# Patient Record
Sex: FEMALE | Race: WHITE | NOT HISPANIC OR LATINO | Employment: FULL TIME | URBAN - NONMETROPOLITAN AREA
[De-identification: names, ages, dates, MRNs, and addresses within clinical notes are randomized per-mention and may not be internally consistent; named-entity substitution may affect disease eponyms.]

---

## 2017-07-15 ENCOUNTER — HOSPITAL ENCOUNTER (EMERGENCY)
Facility: HOSPITAL | Age: 27
Discharge: HOME | End: 2017-07-15
Admitting: EMERGENCY MEDICINE

## 2017-07-15 VITALS
DIASTOLIC BLOOD PRESSURE: 76 MMHG | RESPIRATION RATE: 18 BRPM | TEMPERATURE: 98.5 F | BODY MASS INDEX: 27.82 KG/M2 | HEIGHT: 63 IN | WEIGHT: 157 LBS | SYSTOLIC BLOOD PRESSURE: 120 MMHG | HEART RATE: 73 BPM | OXYGEN SATURATION: 100 %

## 2017-07-15 DIAGNOSIS — G43.009 MIGRAINE WITHOUT AURA AND WITHOUT STATUS MIGRAINOSUS, NOT INTRACTABLE: Primary | ICD-10-CM

## 2017-07-15 LAB
B-HCG UR QL: NEGATIVE
INTERNAL NEGATIVE CONTROL: NEGATIVE
INTERNAL POSITIVE CONTROL: POSITIVE
Lab: NORMAL

## 2017-07-15 PROCEDURE — 25010000002 KETOROLAC TROMETHAMINE PER 15 MG: Performed by: PHYSICIAN ASSISTANT

## 2017-07-15 PROCEDURE — 25010000002 DIPHENHYDRAMINE PER 50 MG: Performed by: PHYSICIAN ASSISTANT

## 2017-07-15 PROCEDURE — 25010000002 PROCHLORPERAZINE EDISYLATE PER 10 MG: Performed by: PHYSICIAN ASSISTANT

## 2017-07-15 PROCEDURE — 96372 THER/PROPH/DIAG INJ SC/IM: CPT

## 2017-07-15 PROCEDURE — 99283 EMERGENCY DEPT VISIT LOW MDM: CPT

## 2017-07-15 RX ORDER — DIPHENHYDRAMINE HYDROCHLORIDE 50 MG/ML
25 INJECTION INTRAMUSCULAR; INTRAVENOUS ONCE
Status: COMPLETED | OUTPATIENT
Start: 2017-07-15 | End: 2017-07-15

## 2017-07-15 RX ORDER — KETOROLAC TROMETHAMINE 30 MG/ML
30 INJECTION, SOLUTION INTRAMUSCULAR; INTRAVENOUS ONCE
Status: COMPLETED | OUTPATIENT
Start: 2017-07-15 | End: 2017-07-15

## 2017-07-15 RX ADMIN — DIPHENHYDRAMINE HYDROCHLORIDE 25 MG: 50 INJECTION, SOLUTION INTRAMUSCULAR; INTRAVENOUS at 19:48

## 2017-07-15 RX ADMIN — KETOROLAC TROMETHAMINE 30 MG: 30 INJECTION, SOLUTION INTRAMUSCULAR at 19:47

## 2017-07-15 RX ADMIN — PROCHLORPERAZINE EDISYLATE 10 MG: 5 INJECTION INTRAMUSCULAR; INTRAVENOUS at 19:50

## 2017-07-16 NOTE — ED PROVIDER NOTES
Subjective   Patient is a 27 y.o. female presenting with migraines.   History provided by:  Patient   used: No    Migraine   Pain location:  L temporal  Quality:  Dull  Radiates to:  Does not radiate  Severity currently:  8/10  Severity at highest:  8/10  Onset quality:  Gradual  Duration:  4 days  Timing:  Constant  Progression:  Unchanged  Chronicity:  Recurrent  Similar to prior headaches: yes    Context: bright light    Context: not activity, not coughing, not defecating, not eating, not stress, not intercourse and not loud noise    Relieved by:  Nothing  Worsened by:  Nothing  Ineffective treatments:  Acetaminophen  Associated symptoms: no abdominal pain, no back pain, no blurred vision, no congestion, no cough, no diarrhea, no dizziness, no eye pain, no fever and no focal weakness    Associated symptoms comment:  Noticed slight amount of blood in left ear after cleaning it today      Review of Systems   Constitutional: Negative for fever.   HENT: Negative for congestion.    Eyes: Negative for blurred vision and pain.   Respiratory: Negative for cough.    Gastrointestinal: Negative for abdominal pain and diarrhea.   Endocrine: Negative.    Musculoskeletal: Negative for back pain.   Neurological: Positive for headaches. Negative for dizziness and focal weakness.       Past Medical History:   Diagnosis Date   • Kidney stone    • Migraine        No Known Allergies    Past Surgical History:   Procedure Laterality Date   • APPENDECTOMY     • CHOLECYSTECTOMY     • DILATATION AND CURETTAGE     • KNEE ACL RECONSTRUCTION         History reviewed. No pertinent family history.    Social History     Social History   • Marital status: Single     Spouse name: N/A   • Number of children: N/A   • Years of education: N/A     Social History Main Topics   • Smoking status: Never Smoker   • Smokeless tobacco: None   • Alcohol use No   • Drug use: No   • Sexual activity: Not Asked     Other Topics Concern   •  None     Social History Narrative   • None           Objective   Physical Exam   Constitutional: She is oriented to person, place, and time. She appears well-developed and well-nourished. No distress.   HENT:   Head: Normocephalic and atraumatic.   Right Ear: Hearing and tympanic membrane normal. No lacerations. No drainage, swelling or tenderness. No foreign bodies. No mastoid tenderness. Tympanic membrane is not injected, not scarred, not perforated, not erythematous, not retracted and not bulging. No middle ear effusion. No decreased hearing is noted.   Left Ear: Hearing normal. No lacerations. No drainage, swelling or tenderness. No foreign bodies. No mastoid tenderness. Tympanic membrane is retracted. Tympanic membrane is not injected, not scarred, not perforated, not erythematous and not bulging.  No middle ear effusion. No decreased hearing is noted.   Eyes: EOM are normal. Pupils are equal, round, and reactive to light.   Cardiovascular: Normal rate, regular rhythm and normal heart sounds.  Exam reveals no friction rub.    No murmur heard.  Pulmonary/Chest: Effort normal and breath sounds normal. No respiratory distress. She has no wheezes. She has no rales.   Neurological: She is alert and oriented to person, place, and time. She has normal reflexes. No cranial nerve deficit.   Skin: Skin is warm and dry. No rash noted. She is not diaphoretic. No erythema. No pallor.   Psychiatric: She has a normal mood and affect. Her behavior is normal.   Nursing note and vitals reviewed.      Procedures         ED Course  ED Course                  MDM    Final diagnoses:   Migraine without aura and without status migrainosus, not intractable            Humberto Zambrano PA-C  07/15/17 1932

## 2017-07-16 NOTE — ED NOTES
C/O HAVING MIGRAINE FOR THE PAST 4 DAYS AND HAD LITTLE BLOOD FROM LEFT EAR.     Sandhya Son RN  07/15/17 1936

## 2017-07-16 NOTE — ED NOTES
Patient discharged home  with family at side ambulatory to personal car. No distress noted. Personal belongings with patient. Patient voiced understanding to instructions given. Pt GOEL feeling better no distress noted.No s/s adverse rx to meds given.  driving pt home.       Sandhya Son RN  07/15/17 2026

## 2017-08-22 LAB
EXTERNAL ABO GROUPING: NORMAL
EXTERNAL ANTIBODY SCREEN: NEGATIVE
EXTERNAL HEPATITIS B SURFACE ANTIGEN: NEGATIVE
EXTERNAL RH FACTOR: POSITIVE
EXTERNAL RUBELLA QUALITATIVE: NORMAL
EXTERNAL SYPHILIS RPR SCREEN: NORMAL
HIV1 P24 AG SERPL QL IA: NORMAL

## 2018-02-10 ENCOUNTER — HOSPITAL ENCOUNTER (OUTPATIENT)
Facility: HOSPITAL | Age: 28
Setting detail: OBSERVATION
Discharge: HOME | End: 2018-02-10
Attending: OBSTETRICS & GYNECOLOGY | Admitting: OBSTETRICS & GYNECOLOGY

## 2018-02-10 VITALS
SYSTOLIC BLOOD PRESSURE: 116 MMHG | BODY MASS INDEX: 29.81 KG/M2 | DIASTOLIC BLOOD PRESSURE: 79 MMHG | RESPIRATION RATE: 18 BRPM | HEIGHT: 63 IN | TEMPERATURE: 98.6 F | HEART RATE: 103 BPM | WEIGHT: 168.25 LBS

## 2018-02-10 LAB
BACTERIA UR QL AUTO: ABNORMAL /HPF
BILIRUB UR QL STRIP: NEGATIVE
CLARITY UR: CLEAR
COLOR UR: YELLOW
GLUCOSE UR STRIP-MCNC: NEGATIVE MG/DL
HGB UR QL STRIP.AUTO: ABNORMAL
HYALINE CASTS UR QL AUTO: ABNORMAL /LPF
KETONES UR QL STRIP: NEGATIVE
LEUKOCYTE ESTERASE UR QL STRIP.AUTO: NEGATIVE
NITRITE UR QL STRIP: NEGATIVE
PH UR STRIP.AUTO: 7 [PH] (ref 5–8)
PROT UR QL STRIP: NEGATIVE
RBC # UR: ABNORMAL /HPF
REF LAB TEST METHOD: ABNORMAL
SP GR UR STRIP: 1.02 (ref 1–1.03)
SQUAMOUS #/AREA URNS HPF: ABNORMAL /HPF
UROBILINOGEN UR QL STRIP: ABNORMAL
WBC UR QL AUTO: ABNORMAL /HPF

## 2018-02-10 PROCEDURE — G0463 HOSPITAL OUTPT CLINIC VISIT: HCPCS

## 2018-02-10 PROCEDURE — 99213 OFFICE O/P EST LOW 20 MIN: CPT | Performed by: OBSTETRICS & GYNECOLOGY

## 2018-02-10 PROCEDURE — 81001 URINALYSIS AUTO W/SCOPE: CPT | Performed by: OBSTETRICS & GYNECOLOGY

## 2018-02-10 PROCEDURE — G0378 HOSPITAL OBSERVATION PER HR: HCPCS

## 2018-02-11 PROBLEM — Z34.90 PREGNANCY: Status: ACTIVE | Noted: 2018-02-11

## 2018-02-11 NOTE — PROGRESS NOTES
"S: Patient here at 32 weeks c/o pelvic pressure, no contractions or vaginal bleeding. Having pain with fetal movement.  O:  /79 (BP Location: Right arm, Patient Position: Lying)  Pulse 103  Temp 98.6 °F (37 °C) (Temporal Artery )   Resp 18  Ht 160 cm (63\")  Wt 76.3 kg (168 lb 4 oz)  LMP 2017  BMI 29.8 kg/m2     Alert, NAD  Breathing unlabored  Abdomen nontender  Cervix long and closed    FHTs reactive  toco shows occasional irritability    UA shows SG 1.019, no WBC and no Nitrites    Assessment: musculoskeletal pain, 32 weeks pregnant    No evidence of  labor or UTI. Discharge home, follow up in 3 days in office.  "

## 2018-02-11 NOTE — NURSING NOTE
Arrived with c/o of lower abdominal pain since Tuesday.  Urinating more frequently.  Increased discharge, clear watery.  Pain noted with fetal movement.  No bleeeding.

## 2018-02-27 LAB — EXTERNAL GROUP B STREP ANTIGEN: NEGATIVE

## 2018-03-14 ENCOUNTER — HOSPITAL ENCOUNTER (INPATIENT)
Facility: HOSPITAL | Age: 28
LOS: 3 days | Discharge: HOME | End: 2018-03-17
Attending: OBSTETRICS & GYNECOLOGY | Admitting: OBSTETRICS & GYNECOLOGY

## 2018-03-14 PROBLEM — O42.90 PROM (PREMATURE RUPTURE OF MEMBRANES): Status: ACTIVE | Noted: 2018-03-14

## 2018-03-14 PROBLEM — O42.90 DELAYED DELIVERY AFTER SROM (SPONTANEOUS RUPTURE OF MEMBRANES): Status: ACTIVE | Noted: 2018-03-14

## 2018-03-14 LAB
A1 MICROGLOB PLACENTAL VAG QL: NEGATIVE
ABO GROUP BLD: NORMAL
BLD GP AB SCN SERPL QL: NEGATIVE
DEPRECATED RDW RBC AUTO: 38.5 FL (ref 40–54)
ERYTHROCYTE [DISTWIDTH] IN BLOOD BY AUTOMATED COUNT: 13.4 % (ref 12–15)
HCT VFR BLD AUTO: 33.1 % (ref 37–47)
HGB BLD-MCNC: 11.5 G/DL (ref 12–16)
MCH RBC QN AUTO: 27.7 PG (ref 28–32)
MCHC RBC AUTO-ENTMCNC: 34.7 G/DL (ref 33–36)
MCV RBC AUTO: 79.8 FL (ref 82–98)
PLATELET # BLD AUTO: 297 10*3/MM3 (ref 130–400)
PMV BLD AUTO: 11.9 FL (ref 6–12)
RBC # BLD AUTO: 4.15 10*6/MM3 (ref 4.2–5.4)
RH BLD: POSITIVE
WBC NRBC COR # BLD: 14.5 10*3/MM3 (ref 4.8–10.8)

## 2018-03-14 PROCEDURE — 84112 EVAL AMNIOTIC FLUID PROTEIN: CPT | Performed by: OBSTETRICS & GYNECOLOGY

## 2018-03-14 PROCEDURE — 85027 COMPLETE CBC AUTOMATED: CPT | Performed by: OBSTETRICS & GYNECOLOGY

## 2018-03-14 PROCEDURE — 51703 INSERT BLADDER CATH COMPLEX: CPT

## 2018-03-14 PROCEDURE — 25010000002 BUTORPHANOL PER 1 MG: Performed by: OBSTETRICS & GYNECOLOGY

## 2018-03-14 PROCEDURE — 86901 BLOOD TYPING SEROLOGIC RH(D): CPT | Performed by: OBSTETRICS & GYNECOLOGY

## 2018-03-14 PROCEDURE — 86850 RBC ANTIBODY SCREEN: CPT | Performed by: OBSTETRICS & GYNECOLOGY

## 2018-03-14 PROCEDURE — 25010000002 PENICILLIN G POTASSIUM PER 600000 UNITS: Performed by: OBSTETRICS & GYNECOLOGY

## 2018-03-14 PROCEDURE — 86900 BLOOD TYPING SEROLOGIC ABO: CPT | Performed by: OBSTETRICS & GYNECOLOGY

## 2018-03-14 RX ORDER — PRENATAL VIT NO.126/IRON/FOLIC 28MG-0.8MG
TABLET ORAL DAILY
COMMUNITY
End: 2020-07-02

## 2018-03-14 RX ORDER — PENICILLIN G POTASSIUM 5000000 [IU]/1
2.5 INJECTION, POWDER, FOR SOLUTION INTRAMUSCULAR; INTRAVENOUS EVERY 4 HOURS
Status: DISCONTINUED | OUTPATIENT
Start: 2018-03-14 | End: 2018-03-14

## 2018-03-14 RX ORDER — ZOLPIDEM TARTRATE 5 MG/1
5 TABLET ORAL NIGHTLY PRN
Status: DISCONTINUED | OUTPATIENT
Start: 2018-03-14 | End: 2018-03-15 | Stop reason: HOSPADM

## 2018-03-14 RX ORDER — PENICILLIN G POTASSIUM 5000000 [IU]/1
5 INJECTION, POWDER, FOR SOLUTION INTRAMUSCULAR; INTRAVENOUS ONCE
Status: DISCONTINUED | OUTPATIENT
Start: 2018-03-14 | End: 2018-03-14 | Stop reason: SDUPTHER

## 2018-03-14 RX ORDER — PENICILLIN G 3000000 [IU]/50ML
3 INJECTION, SOLUTION INTRAVENOUS EVERY 4 HOURS
Status: DISCONTINUED | OUTPATIENT
Start: 2018-03-15 | End: 2018-03-14 | Stop reason: SDUPTHER

## 2018-03-14 RX ORDER — MORPHINE SULFATE 2 MG/ML
2 INJECTION, SOLUTION INTRAMUSCULAR; INTRAVENOUS
Status: DISCONTINUED | OUTPATIENT
Start: 2018-03-14 | End: 2018-03-15

## 2018-03-14 RX ORDER — PROMETHAZINE HYDROCHLORIDE 12.5 MG/1
12.5 SUPPOSITORY RECTAL EVERY 6 HOURS PRN
Status: DISCONTINUED | OUTPATIENT
Start: 2018-03-14 | End: 2018-03-14

## 2018-03-14 RX ORDER — ONDANSETRON 2 MG/ML
4 INJECTION INTRAMUSCULAR; INTRAVENOUS EVERY 6 HOURS PRN
Status: DISCONTINUED | OUTPATIENT
Start: 2018-03-14 | End: 2018-03-15 | Stop reason: HOSPADM

## 2018-03-14 RX ORDER — PROMETHAZINE HYDROCHLORIDE 25 MG/1
12.5 TABLET ORAL EVERY 6 HOURS PRN
Status: DISCONTINUED | OUTPATIENT
Start: 2018-03-14 | End: 2018-03-14

## 2018-03-14 RX ORDER — TERBUTALINE SULFATE 1 MG/ML
0.25 INJECTION, SOLUTION SUBCUTANEOUS AS NEEDED
Status: DISCONTINUED | OUTPATIENT
Start: 2018-03-14 | End: 2018-03-15

## 2018-03-14 RX ORDER — OXYTOCIN/0.9 % SODIUM CHLORIDE 30/500 ML
2-30 PLASTIC BAG, INJECTION (ML) INTRAVENOUS
Status: DISCONTINUED | OUTPATIENT
Start: 2018-03-14 | End: 2018-03-15

## 2018-03-14 RX ORDER — PROMETHAZINE HYDROCHLORIDE 25 MG/ML
12.5 INJECTION, SOLUTION INTRAMUSCULAR; INTRAVENOUS EVERY 6 HOURS PRN
Status: DISCONTINUED | OUTPATIENT
Start: 2018-03-14 | End: 2018-03-14

## 2018-03-14 RX ORDER — PENICILLIN G POTASSIUM 5000000 [IU]/1
5 INJECTION, POWDER, FOR SOLUTION INTRAMUSCULAR; INTRAVENOUS EVERY 4 HOURS
Status: DISCONTINUED | OUTPATIENT
Start: 2018-03-14 | End: 2018-03-14

## 2018-03-14 RX ORDER — LIDOCAINE HYDROCHLORIDE 10 MG/ML
5 INJECTION, SOLUTION EPIDURAL; INFILTRATION; INTRACAUDAL; PERINEURAL AS NEEDED
Status: DISCONTINUED | OUTPATIENT
Start: 2018-03-14 | End: 2018-03-15

## 2018-03-14 RX ORDER — OXYTOCIN/RINGER'S LACTATE 20/1000 ML
999 PLASTIC BAG, INJECTION (ML) INTRAVENOUS ONCE
Status: DISCONTINUED | OUTPATIENT
Start: 2018-03-14 | End: 2018-03-15

## 2018-03-14 RX ORDER — SODIUM CHLORIDE 0.9 % (FLUSH) 0.9 %
1-10 SYRINGE (ML) INJECTION AS NEEDED
Status: DISCONTINUED | OUTPATIENT
Start: 2018-03-14 | End: 2018-03-15

## 2018-03-14 RX ORDER — PENICILLIN G 3000000 [IU]/50ML
3 INJECTION, SOLUTION INTRAVENOUS EVERY 4 HOURS
Status: DISCONTINUED | OUTPATIENT
Start: 2018-03-15 | End: 2018-03-15

## 2018-03-14 RX ORDER — BUTORPHANOL TARTRATE 1 MG/ML
1 INJECTION, SOLUTION INTRAMUSCULAR; INTRAVENOUS
Status: DISCONTINUED | OUTPATIENT
Start: 2018-03-14 | End: 2018-03-15

## 2018-03-14 RX ORDER — ONDANSETRON 4 MG/1
4 TABLET, ORALLY DISINTEGRATING ORAL EVERY 6 HOURS PRN
Status: DISCONTINUED | OUTPATIENT
Start: 2018-03-14 | End: 2018-03-15 | Stop reason: HOSPADM

## 2018-03-14 RX ORDER — ONDANSETRON 4 MG/1
4 TABLET, FILM COATED ORAL EVERY 6 HOURS PRN
Status: DISCONTINUED | OUTPATIENT
Start: 2018-03-14 | End: 2018-03-15 | Stop reason: HOSPADM

## 2018-03-14 RX ORDER — SODIUM CHLORIDE, SODIUM LACTATE, POTASSIUM CHLORIDE, CALCIUM CHLORIDE 600; 310; 30; 20 MG/100ML; MG/100ML; MG/100ML; MG/100ML
125 INJECTION, SOLUTION INTRAVENOUS CONTINUOUS
Status: DISCONTINUED | OUTPATIENT
Start: 2018-03-14 | End: 2018-03-15

## 2018-03-14 RX ORDER — LIDOCAINE HYDROCHLORIDE 10 MG/ML
5 INJECTION, SOLUTION EPIDURAL; INFILTRATION; INTRACAUDAL; PERINEURAL AS NEEDED
Status: DISCONTINUED | OUTPATIENT
Start: 2018-03-14 | End: 2018-03-14 | Stop reason: SDUPTHER

## 2018-03-14 RX ORDER — MORPHINE SULFATE 2 MG/ML
2 INJECTION, SOLUTION INTRAMUSCULAR; INTRAVENOUS
Status: DISCONTINUED | OUTPATIENT
Start: 2018-03-14 | End: 2018-03-14 | Stop reason: SDUPTHER

## 2018-03-14 RX ORDER — ACETAMINOPHEN 325 MG/1
650 TABLET ORAL EVERY 4 HOURS PRN
Status: DISCONTINUED | OUTPATIENT
Start: 2018-03-14 | End: 2018-03-15

## 2018-03-14 RX ORDER — OXYTOCIN/RINGER'S LACTATE 20/1000 ML
125 PLASTIC BAG, INJECTION (ML) INTRAVENOUS AS NEEDED
Status: DISCONTINUED | OUTPATIENT
Start: 2018-03-14 | End: 2018-03-15

## 2018-03-14 RX ADMIN — BUTORPHANOL TARTRATE 1 MG: 1 INJECTION, SOLUTION INTRAMUSCULAR; INTRAVENOUS at 21:57

## 2018-03-14 RX ADMIN — SODIUM CHLORIDE, POTASSIUM CHLORIDE, SODIUM LACTATE AND CALCIUM CHLORIDE 125 ML/HR: 600; 310; 30; 20 INJECTION, SOLUTION INTRAVENOUS at 18:44

## 2018-03-14 RX ADMIN — SODIUM CHLORIDE, POTASSIUM CHLORIDE, SODIUM LACTATE AND CALCIUM CHLORIDE 1000 ML: 600; 310; 30; 20 INJECTION, SOLUTION INTRAVENOUS at 17:56

## 2018-03-14 RX ADMIN — SODIUM CHLORIDE 5 MILLION UNITS: 900 INJECTION INTRAVENOUS at 23:23

## 2018-03-14 NOTE — NURSING NOTE
Dr Cheng called. Amnisure in progress. Jeanne positive. Labor orders given. Confirm Amnisure result.

## 2018-03-14 NOTE — NURSING NOTE
Call placed to Dr Cheng. Jeanne present on slide but Amnisure result negative. Dr Cheng states that she will come to R to evaluate with Speculum exam around 1830. Hold Pitocin for now.--CLH

## 2018-03-15 ENCOUNTER — ANESTHESIA (OUTPATIENT)
Dept: LABOR AND DELIVERY | Facility: HOSPITAL | Age: 28
End: 2018-03-15

## 2018-03-15 ENCOUNTER — ANESTHESIA EVENT (OUTPATIENT)
Dept: LABOR AND DELIVERY | Facility: HOSPITAL | Age: 28
End: 2018-03-15

## 2018-03-15 PROCEDURE — 25010000002 FENTANYL CITRATE (PF) 100 MCG/2ML SOLUTION: Performed by: NURSE ANESTHETIST, CERTIFIED REGISTERED

## 2018-03-15 PROCEDURE — 25010000002 ONDANSETRON PER 1 MG: Performed by: OBSTETRICS & GYNECOLOGY

## 2018-03-15 PROCEDURE — 25010000002 ROPIVACAINE PER 1 MG: Performed by: NURSE ANESTHETIST, CERTIFIED REGISTERED

## 2018-03-15 PROCEDURE — 25010000002 KETOROLAC TROMETHAMINE PER 15 MG: Performed by: NURSE ANESTHETIST, CERTIFIED REGISTERED

## 2018-03-15 PROCEDURE — 25010000002 BUTORPHANOL PER 1 MG: Performed by: OBSTETRICS & GYNECOLOGY

## 2018-03-15 PROCEDURE — 25010000003 CEFAZOLIN PER 500 MG: Performed by: NURSE ANESTHETIST, CERTIFIED REGISTERED

## 2018-03-15 PROCEDURE — 25010000002 PENICILLIN G POTASSIUM PER 600000 UNITS: Performed by: OBSTETRICS & GYNECOLOGY

## 2018-03-15 PROCEDURE — 25010000002 FENTANYL CITRATE (PF) 250 MCG/5ML SOLUTION: Performed by: NURSE ANESTHETIST, CERTIFIED REGISTERED

## 2018-03-15 PROCEDURE — 88307 TISSUE EXAM BY PATHOLOGIST: CPT | Performed by: OBSTETRICS & GYNECOLOGY

## 2018-03-15 PROCEDURE — C1755 CATHETER, INTRASPINAL: HCPCS | Performed by: NURSE ANESTHETIST, CERTIFIED REGISTERED

## 2018-03-15 PROCEDURE — 25010000002 FUROSEMIDE PER 20 MG: Performed by: OBSTETRICS & GYNECOLOGY

## 2018-03-15 PROCEDURE — C1765 ADHESION BARRIER: HCPCS | Performed by: OBSTETRICS & GYNECOLOGY

## 2018-03-15 RX ORDER — FENTANYL CITRATE 50 UG/ML
INJECTION, SOLUTION INTRAMUSCULAR; INTRAVENOUS AS NEEDED
Status: DISCONTINUED | OUTPATIENT
Start: 2018-03-15 | End: 2018-03-15 | Stop reason: SURG

## 2018-03-15 RX ORDER — FAMOTIDINE 10 MG/ML
20 INJECTION, SOLUTION INTRAVENOUS ONCE AS NEEDED
Status: COMPLETED | OUTPATIENT
Start: 2018-03-15 | End: 2018-03-15

## 2018-03-15 RX ORDER — HETASTARCH 6 G/100ML
500 INJECTION, SOLUTION INTRAVENOUS CONTINUOUS
Status: DISCONTINUED | OUTPATIENT
Start: 2018-03-15 | End: 2018-03-15

## 2018-03-15 RX ORDER — SODIUM CHLORIDE, SODIUM LACTATE, POTASSIUM CHLORIDE, CALCIUM CHLORIDE 600; 310; 30; 20 MG/100ML; MG/100ML; MG/100ML; MG/100ML
125 INJECTION, SOLUTION INTRAVENOUS CONTINUOUS
Status: DISCONTINUED | OUTPATIENT
Start: 2018-03-15 | End: 2018-03-16

## 2018-03-15 RX ORDER — SODIUM CHLORIDE 0.9 % (FLUSH) 0.9 %
1-10 SYRINGE (ML) INJECTION AS NEEDED
Status: DISCONTINUED | OUTPATIENT
Start: 2018-03-15 | End: 2018-03-15 | Stop reason: HOSPADM

## 2018-03-15 RX ORDER — OXYTOCIN/RINGER'S LACTATE 20/1000 ML
999 PLASTIC BAG, INJECTION (ML) INTRAVENOUS ONCE
Status: DISCONTINUED | OUTPATIENT
Start: 2018-03-15 | End: 2018-03-15

## 2018-03-15 RX ORDER — OXYCODONE HYDROCHLORIDE AND ACETAMINOPHEN 5; 325 MG/1; MG/1
1 TABLET ORAL EVERY 4 HOURS PRN
Status: DISCONTINUED | OUTPATIENT
Start: 2018-03-15 | End: 2018-03-15 | Stop reason: HOSPADM

## 2018-03-15 RX ORDER — FUROSEMIDE 10 MG/ML
40 INJECTION INTRAMUSCULAR; INTRAVENOUS ONCE
Status: COMPLETED | OUTPATIENT
Start: 2018-03-15 | End: 2018-03-15

## 2018-03-15 RX ORDER — CARBOPROST TROMETHAMINE 250 UG/ML
250 INJECTION, SOLUTION INTRAMUSCULAR AS NEEDED
Status: DISCONTINUED | OUTPATIENT
Start: 2018-03-15 | End: 2018-03-15 | Stop reason: HOSPADM

## 2018-03-15 RX ORDER — KETOROLAC TROMETHAMINE 30 MG/ML
INJECTION, SOLUTION INTRAMUSCULAR; INTRAVENOUS AS NEEDED
Status: DISCONTINUED | OUTPATIENT
Start: 2018-03-15 | End: 2018-03-15 | Stop reason: SURG

## 2018-03-15 RX ORDER — HETASTARCH 6 G/100ML
INJECTION, SOLUTION INTRAVENOUS
Status: COMPLETED
Start: 2018-03-15 | End: 2018-03-15

## 2018-03-15 RX ORDER — OXYCODONE AND ACETAMINOPHEN 10; 325 MG/1; MG/1
1 TABLET ORAL EVERY 4 HOURS PRN
Status: DISCONTINUED | OUTPATIENT
Start: 2018-03-15 | End: 2018-03-17 | Stop reason: HOSPADM

## 2018-03-15 RX ORDER — CEFAZOLIN SODIUM 1 G/3ML
INJECTION, POWDER, FOR SOLUTION INTRAMUSCULAR; INTRAVENOUS AS NEEDED
Status: DISCONTINUED | OUTPATIENT
Start: 2018-03-15 | End: 2018-03-15 | Stop reason: SURG

## 2018-03-15 RX ORDER — IBUPROFEN 600 MG/1
600 TABLET ORAL EVERY 6 HOURS PRN
Status: DISCONTINUED | OUTPATIENT
Start: 2018-03-15 | End: 2018-03-15 | Stop reason: HOSPADM

## 2018-03-15 RX ORDER — LIDOCAINE HYDROCHLORIDE AND EPINEPHRINE BITARTRATE 20; .01 MG/ML; MG/ML
INJECTION, SOLUTION SUBCUTANEOUS AS NEEDED
Status: DISCONTINUED | OUTPATIENT
Start: 2018-03-15 | End: 2018-03-15 | Stop reason: SURG

## 2018-03-15 RX ORDER — CYCLOBENZAPRINE HCL 10 MG
10 TABLET ORAL 3 TIMES DAILY
Status: DISCONTINUED | OUTPATIENT
Start: 2018-03-15 | End: 2018-03-17 | Stop reason: HOSPADM

## 2018-03-15 RX ORDER — ACETAMINOPHEN 325 MG/1
650 TABLET ORAL EVERY 4 HOURS PRN
Status: DISCONTINUED | OUTPATIENT
Start: 2018-03-15 | End: 2018-03-15 | Stop reason: HOSPADM

## 2018-03-15 RX ORDER — ROPIVACAINE HYDROCHLORIDE 5 MG/ML
INJECTION, SOLUTION EPIDURAL; INFILTRATION; PERINEURAL AS NEEDED
Status: DISCONTINUED | OUTPATIENT
Start: 2018-03-15 | End: 2018-03-15 | Stop reason: SURG

## 2018-03-15 RX ORDER — MISOPROSTOL 200 UG/1
800 TABLET ORAL AS NEEDED
Status: DISCONTINUED | OUTPATIENT
Start: 2018-03-15 | End: 2018-03-15 | Stop reason: HOSPADM

## 2018-03-15 RX ORDER — OXYTOCIN/RINGER'S LACTATE 20/1000 ML
PLASTIC BAG, INJECTION (ML) INTRAVENOUS AS NEEDED
Status: DISCONTINUED | OUTPATIENT
Start: 2018-03-15 | End: 2018-03-15 | Stop reason: SURG

## 2018-03-15 RX ORDER — TRISODIUM CITRATE DIHYDRATE AND CITRIC ACID MONOHYDRATE 500; 334 MG/5ML; MG/5ML
15 SOLUTION ORAL ONCE
Status: COMPLETED | OUTPATIENT
Start: 2018-03-15 | End: 2018-03-15

## 2018-03-15 RX ORDER — EPHEDRINE SULFATE 50 MG/ML
5 INJECTION, SOLUTION INTRAVENOUS
Status: DISCONTINUED | OUTPATIENT
Start: 2018-03-15 | End: 2018-03-15

## 2018-03-15 RX ORDER — LIDOCAINE HYDROCHLORIDE 20 MG/ML
INJECTION, SOLUTION EPIDURAL; INFILTRATION; INTRACAUDAL; PERINEURAL AS NEEDED
Status: DISCONTINUED | OUTPATIENT
Start: 2018-03-15 | End: 2018-03-15 | Stop reason: SURG

## 2018-03-15 RX ORDER — OXYCODONE HYDROCHLORIDE AND ACETAMINOPHEN 5; 325 MG/1; MG/1
1 TABLET ORAL EVERY 4 HOURS PRN
Status: DISCONTINUED | OUTPATIENT
Start: 2018-03-15 | End: 2018-03-17 | Stop reason: HOSPADM

## 2018-03-15 RX ORDER — LIDOCAINE HYDROCHLORIDE AND EPINEPHRINE 15; 5 MG/ML; UG/ML
INJECTION, SOLUTION EPIDURAL AS NEEDED
Status: DISCONTINUED | OUTPATIENT
Start: 2018-03-15 | End: 2018-03-15 | Stop reason: SURG

## 2018-03-15 RX ORDER — METHYLERGONOVINE MALEATE 0.2 MG/ML
200 INJECTION INTRAVENOUS ONCE AS NEEDED
Status: DISCONTINUED | OUTPATIENT
Start: 2018-03-15 | End: 2018-03-15 | Stop reason: HOSPADM

## 2018-03-15 RX ORDER — LIDOCAINE HYDROCHLORIDE 10 MG/ML
5 INJECTION, SOLUTION EPIDURAL; INFILTRATION; INTRACAUDAL; PERINEURAL AS NEEDED
Status: DISCONTINUED | OUTPATIENT
Start: 2018-03-15 | End: 2018-03-15

## 2018-03-15 RX ORDER — DEXTROSE AND SODIUM CHLORIDE 5; .9 G/100ML; G/100ML
125 INJECTION, SOLUTION INTRAVENOUS CONTINUOUS
Status: DISCONTINUED | OUTPATIENT
Start: 2018-03-15 | End: 2018-03-15

## 2018-03-15 RX ADMIN — LIDOCAINE HYDROCHLORIDE AND EPINEPHRINE 3 ML: 15; 5 INJECTION, SOLUTION EPIDURAL at 14:05

## 2018-03-15 RX ADMIN — FENTANYL CITRATE 50 MCG: 50 INJECTION INTRAMUSCULAR; INTRAVENOUS at 14:07

## 2018-03-15 RX ADMIN — OXYTOCIN-SODIUM CHLORIDE 0.9% IV SOLN 30 UNIT/500ML 2 MILLI-UNITS/MIN: 30-0.9/5 SOLUTION at 01:20

## 2018-03-15 RX ADMIN — PENICILLIN G 3 MILLION UNITS: 3000000 INJECTION, SOLUTION INTRAVENOUS at 03:21

## 2018-03-15 RX ADMIN — ROPIVACAINE HYDROCHLORIDE 8 ML: 5 INJECTION, SOLUTION EPIDURAL; INFILTRATION; PERINEURAL at 14:07

## 2018-03-15 RX ADMIN — BUTORPHANOL TARTRATE 2 MG: 2 INJECTION, SOLUTION INTRAMUSCULAR; INTRAVENOUS at 01:19

## 2018-03-15 RX ADMIN — SODIUM CHLORIDE, POTASSIUM CHLORIDE, SODIUM LACTATE AND CALCIUM CHLORIDE: 600; 310; 30; 20 INJECTION, SOLUTION INTRAVENOUS at 19:08

## 2018-03-15 RX ADMIN — CEFAZOLIN 2 G: 1 INJECTION, POWDER, FOR SOLUTION INTRAVENOUS at 19:35

## 2018-03-15 RX ADMIN — FUROSEMIDE 40 MG: 10 INJECTION, SOLUTION INTRAMUSCULAR; INTRAVENOUS at 21:36

## 2018-03-15 RX ADMIN — PENICILLIN G 3 MILLION UNITS: 3000000 INJECTION, SOLUTION INTRAVENOUS at 19:17

## 2018-03-15 RX ADMIN — LIDOCAINE HYDROCHLORIDE 3 ML: 20 INJECTION, SOLUTION EPIDURAL; INFILTRATION; INTRACAUDAL; PERINEURAL at 14:02

## 2018-03-15 RX ADMIN — FAMOTIDINE 20 MG: 10 INJECTION INTRAVENOUS at 19:20

## 2018-03-15 RX ADMIN — OXYCODONE HYDROCHLORIDE AND ACETAMINOPHEN 1 TABLET: 10; 325 TABLET ORAL at 21:56

## 2018-03-15 RX ADMIN — HETASTARCH 500 ML: 6 INJECTION, SOLUTION INTRAVENOUS at 17:35

## 2018-03-15 RX ADMIN — Medication 30 UNITS: at 19:48

## 2018-03-15 RX ADMIN — KETOROLAC TROMETHAMINE 30 MG: 30 INJECTION, SOLUTION INTRAMUSCULAR at 20:03

## 2018-03-15 RX ADMIN — PENICILLIN G 3 MILLION UNITS: 3000000 INJECTION, SOLUTION INTRAVENOUS at 12:20

## 2018-03-15 RX ADMIN — OXYTOCIN 125 ML/HR: 10 INJECTION INTRAVENOUS at 21:30

## 2018-03-15 RX ADMIN — LIDOCAINE HYDROCHLORIDE,EPINEPHRINE BITARTRATE 15 ML: 20; .01 INJECTION, SOLUTION INFILTRATION; PERINEURAL at 19:35

## 2018-03-15 RX ADMIN — PENICILLIN G 3 MILLION UNITS: 3000000 INJECTION, SOLUTION INTRAVENOUS at 07:42

## 2018-03-15 RX ADMIN — LIDOCAINE HYDROCHLORIDE,EPINEPHRINE BITARTRATE 15 ML: 20; .01 INJECTION, SOLUTION INFILTRATION; PERINEURAL at 19:20

## 2018-03-15 RX ADMIN — PENICILLIN G 3 MILLION UNITS: 3000000 INJECTION, SOLUTION INTRAVENOUS at 15:47

## 2018-03-15 RX ADMIN — ROPIVACAINE HYDROCHLORIDE 5 ML: 5 INJECTION, SOLUTION EPIDURAL; INFILTRATION; PERINEURAL at 20:00

## 2018-03-15 RX ADMIN — ONDANSETRON HYDROCHLORIDE 8 MG: 2 SOLUTION INTRAMUSCULAR; INTRAVENOUS at 20:00

## 2018-03-15 RX ADMIN — FENTANYL CITRATE 200 MCG: 50 INJECTION INTRAMUSCULAR; INTRAVENOUS at 14:17

## 2018-03-15 RX ADMIN — FENTANYL CITRATE 100 MCG: 50 INJECTION, SOLUTION INTRAMUSCULAR; INTRAVENOUS at 19:20

## 2018-03-15 RX ADMIN — BUTORPHANOL TARTRATE 2 MG: 2 INJECTION, SOLUTION INTRAMUSCULAR; INTRAVENOUS at 13:12

## 2018-03-15 RX ADMIN — SODIUM CITRATE AND CITRIC ACID MONOHYDRATE 15 ML: 500; 334 SOLUTION ORAL at 19:19

## 2018-03-15 RX ADMIN — SODIUM CHLORIDE, POTASSIUM CHLORIDE, SODIUM LACTATE AND CALCIUM CHLORIDE 125 ML/HR: 600; 310; 30; 20 INJECTION, SOLUTION INTRAVENOUS at 10:01

## 2018-03-15 RX ADMIN — ROPIVACAINE HYDROCHLORIDE 8 ML/HR: 2 INJECTION, SOLUTION EPIDURAL; INFILTRATION at 14:17

## 2018-03-15 NOTE — ANESTHESIA PREPROCEDURE EVALUATION
Anesthesia Evaluation     NPO Solid Status: > 8 hours  NPO Liquid Status: > 6 hours           Airway   Mallampati: II  No difficulty expected  Dental - normal exam     Pulmonary - negative pulmonary ROS and normal exam   Cardiovascular - negative cardio ROS and normal exam        Neuro/Psych  (+) headaches,     GI/Hepatic/Renal/Endo - negative ROS     Musculoskeletal (-) negative ROS    Abdominal  - normal exam   Substance History - negative use     OB/GYN    (+) Pregnant,         Other - negative ROS                       Anesthesia Plan    ASA 2     epidural     Anesthetic plan and risks discussed with patient.

## 2018-03-15 NOTE — ANESTHESIA PROCEDURE NOTES
Labor Epidural    Patient location during procedure: OB  Indication:at surgeon's request  Performed By  CRNA: YVES BARKER  Preanesthetic Checklist  Completed: patient identified, site marked, surgical consent, pre-op evaluation, timeout performed, IV checked, risks and benefits discussed and monitors and equipment checked  Prep:  Pt Position:sitting  Sterile Tech:cap, gloves, mask and sterile barrier  Prep:chlorhexidine gluconate and isopropyl alcohol  Monitoring:blood pressure monitoring and continuous pulse oximetry  Epidural Block Procedure:  Approach:midline  Guidance:landmark technique  Location:L4-L5  Needle Type:Tuohy  Needle Gauge:18 G  Loss of Resistance Medium: saline  Paresthesia: none  Aspiration:negative  Test Dose:negative  Number of Attempts: 1  Post Assessment:  Dressing:biopatch applied, occlusive dressing applied and secured with tape  Pt Tolerance:patient tolerated the procedure well with no apparent complications  Complications:no

## 2018-03-15 NOTE — NURSING NOTE
MD DID A SPECULUM EXAM.  VISUALIZED FLUID COMING FROM THE CERVICAL OS.  MD DECIDED TO NOT DO AN AMNISURE AFTER VISUALIZING THE POOLING.

## 2018-03-15 NOTE — H&P
[facility]  Obstetric History and Physical        Subjective     This is a 27 year old female,  at 36 6/6 gestation a direct admission from the office today.  Patient presented to the office this afternoon with complaints of leakage of fluid, pelvic pressure and decreased fetal movement.  Biophysical profile in the office with AFV of 8.  Patient was then sent to labor and delivery for further evaluation and work up.    Amniosure was collected twice, told by nurse the first sample was collected incorrectly, but a second specimen was sent.  Slide with obvious ferning, but amnisure was negative for rupture.  Nurse stated after collection of the second specimen a bimanual exam was performed.  States cervix one cm dilated and while quite a bit of vaginal secretions no further fluid was expressed with digital exam.    Patient was admitted, given IVF.  After several hours of admission I was able to perform bedside exam.  Speculum was placed with gross pooling in the vault.  Amniotic fluid was  Seen expressed from the os with maternal valsalva.  Cervical exam after removal of the speculum was with change of 2-3/80/-2  Head well applied.    Patient with negative GBS, but due to early gestational age and unsure of timing of actual rupture of membranes antibiotics were started with pitocin.    Prenatal course complicated by echogenic foci- since resolved after 28 weeks gestation.      Prenatal Information:  Prenatal Results         Initial Prenatal Labs Ref. Range Date Time   Hemoglobin       Hematocrit       Platelets  297 10*3/mm3 130 - 400 10*3/mm3 18 1755   Rubella IgG ^ Immune   17    Hepatitis B SAg ^ Negative   17    Hepatitis C Ab       RPR ^ Non-Reactive   17    ABO  B   18 1755   Rh  Positive   18 1755   Antibody Screen ^ Negative   17    HIV ^ Non-Reactive   17    Urine Culture       Gonorrhea       Chlamydia       TSH       2nd and 3rd Trimester Ref. Range Date  Time   Hemoglobin (repeated)  11.5 g/dL (L) 12.0 - 16.0 g/dL 03/14/18 1755   Hematocrit (repeated)  33.1 % (L) 37.0 - 47.0 % 03/14/18 1755   GCT       Antibody Screen (repeated)  Negative   03/14/18 1755   GTT Fasting       GTT 1 Hr       GTT 2 Hr       GTT 3 Hr       Group B Strep ^ Negative   02/27/18    Drug Screening Ref. Range Date Time   Amphetamine Screen       Barbiturate Screen       Benzodiazepine Screen       Methadone Screen       Phencyclidine Screen       Opiates Screen       THC Screen       Cocaine Screen       Propoxyphene Screen       Buprenorphine Screen       Methamphetamine Screen       Oxycodone Screen       Tryicyclic Antidepressants Screen       Other (Risk screening) Ref. Range Date Time   Varicella IgG       Parvovirus IgG       CMV IgG       Cystic Fibrosis       Hemoglobin electrophoresis       NIPT       MSAFP-4       AFP (for NTD only)                 Legend: ^: Historical                   External Prenatal Results         Pregnancy Outside Results - these were transcribed from office records.  See scanned records for details. Date Time   Hgb  11.5 g/dL (L) 03/14/18 1755   Hct  33.1 % (L) 03/14/18 1755   ABO  B  03/14/18 1755   Rh  Positive  03/14/18 1755   Antibody Screen  Negative  03/14/18 1755   Glucose Fasting GTT      Glucose Tolerance Test 1 hour      Glucose Tolerance Test 3 hour      Gonorrhea (discrete)      Chlamydia (discrete)      RPR ^ Non-Reactive  08/22/17    VDRL      Syphillis Antibody      Rubella ^ Immune  08/22/17    HBsAg ^ Negative  08/22/17    Herpes Simplex Virus PCR      Herpes Simplex VIrus Culture      HIV ^ Non-Reactive  08/22/17    Hep C RNA Quant PCR      Hep C Antibody      AFP      Group B Strep ^ Negative  02/27/18    GBS Susceptibility to Clindamycin      GBS Susceptibility to Eythromycin      Fetal Fibronectin      Genetic Testing, Maternal Blood      Drug Screening Date Time   Urine Drug Screen      Amphetamine Screen      Barbiturate Screen       Benzodiazepine Screen      Methadone Screen      Phencyclidine Screen      Opiates Screen      THC Screen      Cocaine Screen      Propoxyphene Screen      Buprenorphine Screen      Methamphetamine Screen      Oxycodone Screen      Tryicyclic Antidepressants Screen                Legend: ^: Historical                      Past OB History:     Obstetric History       T0      L0     SAB1   TAB0   Ectopic0   Molar0   Multiple0   Live Births0       # Outcome Date GA Lbr Oseas/2nd Weight Sex Delivery Anes PTL Lv   2 Current            1 SAB 06/01/15 8w0d    SAB             Past Medical History: Past Medical History:   Diagnosis Date   • Kidney stone    • Migraine       Past Surgical History Past Surgical History:   Procedure Laterality Date   • APPENDECTOMY     • CHOLECYSTECTOMY     • DILATATION AND CURETTAGE     • KNEE ACL RECONSTRUCTION        Family History: History reviewed. No pertinent family history.   Social History:  reports that she has never smoked. She has never used smokeless tobacco.   reports that she does not drink alcohol.   reports that she does not use drugs.        General ROS: Pertinent items are noted in HPI    Objective       Vital Signs Range for the last 24 hours  Temperature: Temp:  [97.9 °F (36.6 °C)] 97.9 °F (36.6 °C)   Temp Source: Temp src: Oral   BP: BP: (126-133)/(72-89) 126/72   Pulse: Heart Rate:  [80-90] 90   Respirations: Resp:  [18] 18   SPO2:     O2 Amount (l/min):     O2 Devices     Weight: Weight:  [78 kg (172 lb)] 78 kg (172 lb)     Physical Examination: General appearance - alert, well appearing, and in no distress  Mental status - alert, oriented to person, place, and time  Neck - supple, no significant adenopathy  Chest - clear to auscultation, no wheezes, rales or rhonchi, symmetric air entry  Heart - normal rate, regular rhythm, normal S1, S2, no murmurs, rubs, clicks or gallops  Abdomen - soft, nontender, nondistended, no masses or organomegaly, gravid non  tender S=D  Pelvic - normal external genitalia, vulva, vagina,  Back exam - full range of motion, no tenderness, palpable spasm or pain on motion  Neurological - alert, oriented, normal speech, no focal findings or movement disorder noted  Musculoskeletal - no joint tenderness, deformity or swelling  Extremities - pretibial edema to mid thigh +  Skin - normal coloration and turgor, no rashes, no suspicious skin lesions noted    Presentation: vertex   Cervix: Exam by: Method: sterile exam per physician   Dilation: Cervical Dilation (cm): 2-3   Effacement: Cervical Effacement: 70-80%   Station:  -2 head well applied       Fetal Heart Rate Assessment   Method: Fetal HR Assessment Method: external   Beats/min: Fetal HR (beats/min): 145   Baseline: Fetal Heart Baseline Rate: normal range   Varibility: Fetal HR Variability: moderate (amplitude range 6 to 25 bpm)   Accels: Fetal HR Accelerations: greater than/equal to 15 bpm, lasting at least 15 seconds   Decels: Fetal HR Decelerations: absent   Tracing Category: Fetal HR Tracing Category: Category I     Uterine Assessment   Method: Method: per patient report   Frequency (min): Contraction Frequency (Minutes): 5   Ctx Count in 10 min:     Duration: Contraction Duration: 60-90 seconds   Intensity: Contraction Intensity: mild by palpation                     Assessment:  1.  Intrauterine pregnancy at 36 6/6 weeks gestation  2.  PROM  3.  Negative GBS    Plan:  1.  Admit  2.  Pitocin augmentation  3.  Antibiotics due to unsure timing of rupture and early       Gestational age      Helen Cheng DO  3/14/2018  9:34 PM

## 2018-03-15 NOTE — PROGRESS NOTES
Reactive fetal heart monitor  Irregular contractions since pitocin halved  Bimanual exam:  4/80/-2 head well applied  Amniotomy of fore- bag  Bloody fluid expressed  Bolus fluids for epidural  Stadol while waiting for anesthesia

## 2018-03-15 NOTE — PLAN OF CARE
Problem: Patient Care Overview  Goal: Plan of Care Review  Outcome: Ongoing (interventions implemented as appropriate)   03/15/18 0637   Coping/Psychosocial   Plan of Care Reviewed With patient   Plan of Care Review   Progress no change   OTHER   Outcome Summary 1-2/70/-3 afebrile; contractions 1-3 minutes; fetal well being noted      Goal: Individualization and Mutuality  Outcome: Ongoing (interventions implemented as appropriate)    Goal: Discharge Needs Assessment  Outcome: Ongoing (interventions implemented as appropriate)

## 2018-03-15 NOTE — NURSING NOTE
Dr Cheng called for updated. Chux pad examined but no fluid noted. Pt states that she had more leaking when she stood up to go to the bathroom. Dr Cole will repeat Amnisure when she arrives.

## 2018-03-16 LAB
HCT VFR BLD AUTO: 24.2 % (ref 37–47)
HGB BLD-MCNC: 8.1 G/DL (ref 12–16)

## 2018-03-16 PROCEDURE — 51703 INSERT BLADDER CATH COMPLEX: CPT

## 2018-03-16 PROCEDURE — 94799 UNLISTED PULMONARY SVC/PX: CPT

## 2018-03-16 PROCEDURE — 25010000002 ENOXAPARIN PER 10 MG: Performed by: OBSTETRICS & GYNECOLOGY

## 2018-03-16 PROCEDURE — 85018 HEMOGLOBIN: CPT | Performed by: OBSTETRICS & GYNECOLOGY

## 2018-03-16 PROCEDURE — 25010000002 KETOROLAC TROMETHAMINE PER 15 MG: Performed by: OBSTETRICS & GYNECOLOGY

## 2018-03-16 PROCEDURE — 85014 HEMATOCRIT: CPT | Performed by: OBSTETRICS & GYNECOLOGY

## 2018-03-16 RX ORDER — OXYTOCIN/RINGER'S LACTATE 20/1000 ML
999 PLASTIC BAG, INJECTION (ML) INTRAVENOUS ONCE
Status: DISCONTINUED | OUTPATIENT
Start: 2018-03-16 | End: 2018-03-16

## 2018-03-16 RX ORDER — CARBOPROST TROMETHAMINE 250 UG/ML
250 INJECTION, SOLUTION INTRAMUSCULAR AS NEEDED
Status: DISCONTINUED | OUTPATIENT
Start: 2018-03-16 | End: 2018-03-16

## 2018-03-16 RX ORDER — OXYTOCIN/RINGER'S LACTATE 20/1000 ML
999 PLASTIC BAG, INJECTION (ML) INTRAVENOUS CONTINUOUS
Status: ACTIVE | OUTPATIENT
Start: 2018-03-16 | End: 2018-03-16

## 2018-03-16 RX ORDER — IBUPROFEN 800 MG/1
800 TABLET ORAL EVERY 6 HOURS PRN
Status: DISCONTINUED | OUTPATIENT
Start: 2018-03-16 | End: 2018-03-16

## 2018-03-16 RX ORDER — ACETAMINOPHEN 325 MG/1
650 TABLET ORAL EVERY 4 HOURS PRN
Status: DISCONTINUED | OUTPATIENT
Start: 2018-03-16 | End: 2018-03-16

## 2018-03-16 RX ORDER — METHYLERGONOVINE MALEATE 0.2 MG/ML
200 INJECTION INTRAVENOUS AS NEEDED
Status: DISCONTINUED | OUTPATIENT
Start: 2018-03-16 | End: 2018-03-16

## 2018-03-16 RX ORDER — NALBUPHINE HYDROCHLORIDE 20 MG/ML
2.5 INJECTION, SOLUTION INTRAMUSCULAR; INTRAVENOUS; SUBCUTANEOUS EVERY 4 HOURS PRN
Status: DISCONTINUED | OUTPATIENT
Start: 2018-03-16 | End: 2018-03-16

## 2018-03-16 RX ORDER — PROMETHAZINE HYDROCHLORIDE 25 MG/ML
12.5 INJECTION, SOLUTION INTRAMUSCULAR; INTRAVENOUS EVERY 6 HOURS PRN
Status: DISCONTINUED | OUTPATIENT
Start: 2018-03-16 | End: 2018-03-17 | Stop reason: HOSPADM

## 2018-03-16 RX ORDER — PROMETHAZINE HYDROCHLORIDE 12.5 MG/1
12.5 SUPPOSITORY RECTAL EVERY 6 HOURS PRN
Status: DISCONTINUED | OUTPATIENT
Start: 2018-03-16 | End: 2018-03-17 | Stop reason: HOSPADM

## 2018-03-16 RX ORDER — OXYCODONE AND ACETAMINOPHEN 7.5; 325 MG/1; MG/1
1 TABLET ORAL EVERY 4 HOURS PRN
Status: DISCONTINUED | OUTPATIENT
Start: 2018-03-16 | End: 2018-03-16

## 2018-03-16 RX ORDER — CALCIUM CARBONATE 200(500)MG
2 TABLET,CHEWABLE ORAL EVERY 6 HOURS PRN
Status: DISCONTINUED | OUTPATIENT
Start: 2018-03-16 | End: 2018-03-17 | Stop reason: HOSPADM

## 2018-03-16 RX ORDER — ONDANSETRON 2 MG/ML
4 INJECTION INTRAMUSCULAR; INTRAVENOUS EVERY 6 HOURS PRN
Status: DISCONTINUED | OUTPATIENT
Start: 2018-03-16 | End: 2018-03-16

## 2018-03-16 RX ORDER — BUTORPHANOL TARTRATE 1 MG/ML
0.5 INJECTION, SOLUTION INTRAMUSCULAR; INTRAVENOUS EVERY 6 HOURS PRN
Status: DISCONTINUED | OUTPATIENT
Start: 2018-03-16 | End: 2018-03-16

## 2018-03-16 RX ORDER — NALOXONE HCL 0.4 MG/ML
0.1 VIAL (ML) INJECTION
Status: DISCONTINUED | OUTPATIENT
Start: 2018-03-16 | End: 2018-03-17 | Stop reason: HOSPADM

## 2018-03-16 RX ORDER — ONDANSETRON 2 MG/ML
4 INJECTION INTRAMUSCULAR; INTRAVENOUS EVERY 6 HOURS PRN
Status: DISCONTINUED | OUTPATIENT
Start: 2018-03-16 | End: 2018-03-17 | Stop reason: HOSPADM

## 2018-03-16 RX ORDER — METHYLERGONOVINE MALEATE 0.2 MG/ML
200 INJECTION INTRAVENOUS ONCE
Status: DISCONTINUED | OUTPATIENT
Start: 2018-03-16 | End: 2018-03-16

## 2018-03-16 RX ORDER — HYDROMORPHONE HCL 110MG/55ML
2 PATIENT CONTROLLED ANALGESIA SYRINGE INTRAVENOUS EVERY 4 HOURS PRN
Status: DISCONTINUED | OUTPATIENT
Start: 2018-03-16 | End: 2018-03-17 | Stop reason: HOSPADM

## 2018-03-16 RX ORDER — MISOPROSTOL 200 UG/1
600 TABLET ORAL ONCE
Status: DISCONTINUED | OUTPATIENT
Start: 2018-03-16 | End: 2018-03-16

## 2018-03-16 RX ORDER — CARBOPROST TROMETHAMINE 250 UG/ML
250 INJECTION, SOLUTION INTRAMUSCULAR ONCE
Status: DISCONTINUED | OUTPATIENT
Start: 2018-03-16 | End: 2018-03-16

## 2018-03-16 RX ORDER — MISOPROSTOL 200 UG/1
800 TABLET ORAL AS NEEDED
Status: DISCONTINUED | OUTPATIENT
Start: 2018-03-16 | End: 2018-03-16

## 2018-03-16 RX ORDER — PRENATAL VIT/IRON FUM/FOLIC AC 27MG-0.8MG
1 TABLET ORAL DAILY
Status: DISCONTINUED | OUTPATIENT
Start: 2018-03-16 | End: 2018-03-17 | Stop reason: HOSPADM

## 2018-03-16 RX ORDER — OXYCODONE AND ACETAMINOPHEN 7.5; 325 MG/1; MG/1
2 TABLET ORAL EVERY 4 HOURS PRN
Status: DISCONTINUED | OUTPATIENT
Start: 2018-03-16 | End: 2018-03-16

## 2018-03-16 RX ORDER — ONDANSETRON 4 MG/1
4 TABLET, FILM COATED ORAL EVERY 8 HOURS PRN
Status: DISCONTINUED | OUTPATIENT
Start: 2018-03-16 | End: 2018-03-17 | Stop reason: HOSPADM

## 2018-03-16 RX ORDER — SODIUM CHLORIDE, SODIUM LACTATE, POTASSIUM CHLORIDE, CALCIUM CHLORIDE 600; 310; 30; 20 MG/100ML; MG/100ML; MG/100ML; MG/100ML
125 INJECTION, SOLUTION INTRAVENOUS CONTINUOUS
Status: DISCONTINUED | OUTPATIENT
Start: 2018-03-16 | End: 2018-03-17 | Stop reason: HOSPADM

## 2018-03-16 RX ORDER — KETOROLAC TROMETHAMINE 30 MG/ML
30 INJECTION, SOLUTION INTRAMUSCULAR; INTRAVENOUS EVERY 6 HOURS SCHEDULED
Status: DISPENSED | OUTPATIENT
Start: 2018-03-16 | End: 2018-03-16

## 2018-03-16 RX ORDER — PROMETHAZINE HYDROCHLORIDE 25 MG/1
25 TABLET ORAL EVERY 6 HOURS PRN
Status: DISCONTINUED | OUTPATIENT
Start: 2018-03-16 | End: 2018-03-17 | Stop reason: HOSPADM

## 2018-03-16 RX ORDER — MORPHINE SULFATE 2 MG/ML
2 INJECTION, SOLUTION INTRAMUSCULAR; INTRAVENOUS
Status: DISCONTINUED | OUTPATIENT
Start: 2018-03-16 | End: 2018-03-16

## 2018-03-16 RX ORDER — OXYTOCIN/RINGER'S LACTATE 20/1000 ML
125 PLASTIC BAG, INJECTION (ML) INTRAVENOUS AS NEEDED
Status: DISCONTINUED | OUTPATIENT
Start: 2018-03-16 | End: 2018-03-16

## 2018-03-16 RX ORDER — NALOXONE HCL 0.4 MG/ML
0.4 VIAL (ML) INJECTION
Status: DISCONTINUED | OUTPATIENT
Start: 2018-03-16 | End: 2018-03-16

## 2018-03-16 RX ORDER — SIMETHICONE 80 MG
80 TABLET,CHEWABLE ORAL 4 TIMES DAILY PRN
Status: DISCONTINUED | OUTPATIENT
Start: 2018-03-16 | End: 2018-03-17 | Stop reason: HOSPADM

## 2018-03-16 RX ORDER — DOCUSATE SODIUM 100 MG/1
100 CAPSULE, LIQUID FILLED ORAL 2 TIMES DAILY PRN
Status: DISCONTINUED | OUTPATIENT
Start: 2018-03-16 | End: 2018-03-17 | Stop reason: HOSPADM

## 2018-03-16 RX ORDER — METHYLERGONOVINE MALEATE 0.2 MG/ML
200 INJECTION INTRAVENOUS ONCE AS NEEDED
Status: DISCONTINUED | OUTPATIENT
Start: 2018-03-16 | End: 2018-03-16

## 2018-03-16 RX ORDER — IBUPROFEN 800 MG/1
800 TABLET ORAL EVERY 6 HOURS PRN
Status: DISCONTINUED | OUTPATIENT
Start: 2018-03-17 | End: 2018-03-17 | Stop reason: HOSPADM

## 2018-03-16 RX ORDER — FERROUS SULFATE 325(65) MG
325 TABLET ORAL
Status: DISCONTINUED | OUTPATIENT
Start: 2018-03-17 | End: 2018-03-17 | Stop reason: HOSPADM

## 2018-03-16 RX ORDER — DIPHENHYDRAMINE HCL 25 MG
25 CAPSULE ORAL EVERY 4 HOURS PRN
Status: DISCONTINUED | OUTPATIENT
Start: 2018-03-16 | End: 2018-03-17 | Stop reason: HOSPADM

## 2018-03-16 RX ADMIN — ENOXAPARIN SODIUM 40 MG: 40 INJECTION SUBCUTANEOUS at 20:38

## 2018-03-16 RX ADMIN — OXYCODONE HYDROCHLORIDE AND ACETAMINOPHEN 1 TABLET: 10; 325 TABLET ORAL at 15:36

## 2018-03-16 RX ADMIN — CYCLOBENZAPRINE HYDROCHLORIDE 10 MG: 10 TABLET, FILM COATED ORAL at 20:35

## 2018-03-16 RX ADMIN — DOCUSATE SODIUM 100 MG: 100 CAPSULE, LIQUID FILLED ORAL at 11:20

## 2018-03-16 RX ADMIN — CYCLOBENZAPRINE HYDROCHLORIDE 10 MG: 10 TABLET, FILM COATED ORAL at 15:37

## 2018-03-16 RX ADMIN — SODIUM CHLORIDE, POTASSIUM CHLORIDE, SODIUM LACTATE AND CALCIUM CHLORIDE 125 ML/HR: 600; 310; 30; 20 INJECTION, SOLUTION INTRAVENOUS at 04:12

## 2018-03-16 RX ADMIN — OXYCODONE HYDROCHLORIDE AND ACETAMINOPHEN 1 TABLET: 10; 325 TABLET ORAL at 20:51

## 2018-03-16 RX ADMIN — PRENATAL VIT W/ FE FUMARATE-FA TAB 27-0.8 MG 1 TABLET: 27-0.8 TAB at 11:19

## 2018-03-16 RX ADMIN — KETOROLAC TROMETHAMINE 30 MG: 30 INJECTION, SOLUTION INTRAMUSCULAR at 18:33

## 2018-03-16 RX ADMIN — KETOROLAC TROMETHAMINE 30 MG: 30 INJECTION, SOLUTION INTRAMUSCULAR at 05:57

## 2018-03-16 RX ADMIN — OXYCODONE HYDROCHLORIDE AND ACETAMINOPHEN 1 TABLET: 10; 325 TABLET ORAL at 11:20

## 2018-03-16 RX ADMIN — KETOROLAC TROMETHAMINE 30 MG: 30 INJECTION, SOLUTION INTRAMUSCULAR at 11:20

## 2018-03-16 RX ADMIN — CYCLOBENZAPRINE HYDROCHLORIDE 10 MG: 10 TABLET, FILM COATED ORAL at 00:13

## 2018-03-16 NOTE — LACTATION NOTE
This note was copied from a baby's chart.  Initiated hospital grade breast pump at bedside. Mom pumping due to infants disorganized suck. Demonstrated breast pump use and cleaning to mom and dad. Mom asked appropriate questions, denies any other questions or concerns at this time. Mom requested to wait til visitors leave to use pump. Instructed mom to call lactation with any questions or concerns. Provided support and encouragement.  Mom states does not have breast pump for home use, has [employer], requested RX be faxed to pumping [employer]s through [employer].      1551 Faxed RX per moms request

## 2018-03-16 NOTE — PLAN OF CARE
Problem: Patient Care Overview  Goal: Plan of Care Review  Outcome: Ongoing (interventions implemented as appropriate)   03/16/18 0006   Coping/Psychosocial   Plan of Care Reviewed With patient;spouse     Goal: Individualization and Mutuality  Outcome: Ongoing (interventions implemented as appropriate)    Goal: Discharge Needs Assessment  Outcome: Ongoing (interventions implemented as appropriate)

## 2018-03-16 NOTE — BRIEF OP NOTE
SECTION PRIMARY  Progress Note    Bailee Antonderkirk  3/14/2018 - 3/15/2018    Pre-op Diagnosis:   Intrauterine pregnancy at 37 weeks gestation  Prolonged premature rupture of membranes  Suspect concealed abruption  Failure to progress         Post-Op Diagnosis Codes:   same  50% placental abruption  Loose nuchal cord          Procedure(s):   SECTION PRIMARY    Surgeon(s):  Helen Cheng DO    Anesthesia: Choice    Staff:   Circulator: Ashley Calderon RN  NICU Nurse: Sheryl Rocha RN  OB TECH: Jacquie Colorado; Lorraine Lafleur  NICU APRN: MASON Weaver    Estimated Blood Loss: 1000 cc    Urine Voided: 500 cc concentrated urine  Specimens:     Placenta                       Helen Cheng DO     Date: 3/15/2018  Time: 9:02 PM

## 2018-03-16 NOTE — PLAN OF CARE
Problem: Patient Care Overview  Goal: Plan of Care Review  Outcome: Ongoing (interventions implemented as appropriate)   03/16/18 7843   Coping/Psychosocial   Plan of Care Reviewed With patient;spouse   Plan of Care Review   Progress improving   OTHER   Outcome Summary VSS. Good output. Strong catheter to come out at 0600. Pt wants to breastfeed but is fine with supplementing as well. FF U1 Scant lochia.      Goal: Individualization and Mutuality  Outcome: Ongoing (interventions implemented as appropriate)    Goal: Discharge Needs Assessment  Outcome: Ongoing (interventions implemented as appropriate)    Goal: Interprofessional Rounds/Family Conf  Outcome: Ongoing (interventions implemented as appropriate)      Problem: Skin Injury Risk (Adult)  Goal: Identify Related Risk Factors and Signs and Symptoms  Outcome: Ongoing (interventions implemented as appropriate)    Goal: Skin Health and Integrity  Outcome: Ongoing (interventions implemented as appropriate)

## 2018-03-16 NOTE — PLAN OF CARE
Problem: Patient Care Overview  Goal: Individualization and Mutuality   03/15/18 0637 03/15/18 1949   Individualization   Patient Specific Preferences epidural, breastfeeding  --    Patient Specific Goals (Include Timeframe) --  patient not having     Patient Specific Interventions position changes, fluid bolus, lactation consultation  --    Mutuality/Individual Preferences   What Anxieties, Fears, Concerns, or Questions Do You Have About Your Care? pain --      Goal: Discharge Needs Assessment   03/15/18 0637   Discharge Needs Assessment   Readmission Within the Last 30 Days no previous admission in last 30 days   Concerns to be Addressed no discharge needs identified   Patient/Family Anticipates Transition to home with family   Patient/Family Anticipated Services at Transition none   Equipment Needed After Discharge none   Disability   Equipment Currently Used at Home none       Problem: Labor (Cervical Ripen, Induct, Augment) (Adult,Obstetrics,Pediatric)  Goal: Signs and Symptoms of Listed Potential Problems Will be Absent, Minimized or Managed (Labor)  Outcome: Ongoing (interventions implemented as appropriate)   03/15/18 1949   Goal/Outcome Evaluation   Problems Assessed (Labor) all   Problems Present (Labor) none

## 2018-03-16 NOTE — PROGRESS NOTES
Pitocin discontinued over an hour ago due to continued fetal heart  Rate decelerations.  Spontaneous recovery shortly after pitocin stopped and bolus of   Fluids.  No cervical change since rupture of fore-bag  Several large clots  Expressed on bimanual exam, with caput palpated  With exam.  Discussed concerns with patient and spouse.  Unsure of exact time of  Ruptured membranes; thinks she has been leaking fluid since Saturday  (5 days).  Admitted yesterday evening after definitive diagnosis of rupture and antibiotics as well as pitocin started at that time.  Minimal cervical change and upon rupture of fore-bag a large amount of bloody fluid expressed, and since has continued to have a moderate amount of bleeding and clot- despite little cervical change.  Strong suspicion for concealed abruption- and given overall clinical picture recommend more definitive intervention at this time.  Patient teary eyed and desires  section at this time as well.  Risks, benefits and alternatives reviewed with patient and spouse with all questions and concerns addressed.  Informed consent obtained

## 2018-03-16 NOTE — LACTATION NOTE
This note was copied from a baby's chart.  Lactation visit, infant is a 37  0/7 week gestation girl, delivered via c section 3/15/18 @ 1946, birth weight 5 lbs 13.5 oz (2650 grams) Mom desires to breast and formula feed infant. Mom states infant latched immediately following delivery, has had difficulty with latch since delivery. Breastfeeding education completed, lactation number wrote on communication board, encouraged mom to call lactation at next breastfeeding session to assist. Mom verbalized good understanding, denies any other questions or concerns at this time. Provided support and encouargement.        Mom states has double electric breast pump for home use    Initial breastfeeding packet given/reviewed    1300 Follow up lactation visit to check on infant feeding schedule. Mom states wants to feed infant formula at this time. Encouraged mom to call lactation if needs assistance, reinforced basic breastfeeding education, provided support and encouragement.    1410 Mom called for assistance with breastfeeding, assisted mom with latch and position, unsuccessful. Infant is biting, instead of suckling. Mom states infant had difficulty taking formula from bottle last feeding. Suck training with gloved finger completed with infant. Infant continues to bite, instead of suckle. Mom states latch in painful. Mom feeding infant formula via slow flow nipple. Instructed mom that need to use breast pump for milk supply. Instructed mom to call when infant is finished eating for lactation to initiate hospital grade breast pump at bedside. Provide support and encouragement.

## 2018-03-16 NOTE — ANESTHESIA POSTPROCEDURE EVALUATION
"Patient: Bailee Phoenix    Procedure Summary     Date:  03/15/18 Room / Location:  [facility] LABOR DELIVERY    Anesthesia Start:   Anesthesia Stop:      Procedure:   SECTION PRIMARY (N/A Abdomen) Diagnosis:      Surgeon:  Helen Cheng DO Provider:  Romero Cali CRNA    Anesthesia Type:  epidural ASA Status:  2          Anesthesia Type: epidural  Last vitals  BP   120/77 (18 1100)   Temp   97.8 °F (36.6 °C) (18 1100)   Pulse   92 (18 1100)   Resp   15 (18 1100)     SpO2   98 % (18 1100)     Post Anesthesia Care and Evaluation    Patient location during evaluation: PHASE II  Patient participation: complete - patient participated  Level of consciousness: awake  Pain score: 0  Pain management: adequate  Airway patency: patent  Anesthetic complications: No anesthetic complications  PONV Status: none  Cardiovascular status: acceptable  Respiratory status: acceptable  Hydration status: acceptable    Comments: Blood pressure 120/77, pulse 92, temperature 97.8 °F (36.6 °C), temperature source Temporal Artery , resp. rate 15, height 160 cm (63\"), weight 78 kg (172 lb), last menstrual period 2017, SpO2 98 %, unknown if currently breastfeeding.        "

## 2018-03-17 VITALS
HEIGHT: 63 IN | BODY MASS INDEX: 30.48 KG/M2 | TEMPERATURE: 97.1 F | WEIGHT: 172 LBS | HEART RATE: 106 BPM | OXYGEN SATURATION: 97 % | SYSTOLIC BLOOD PRESSURE: 126 MMHG | DIASTOLIC BLOOD PRESSURE: 71 MMHG | RESPIRATION RATE: 20 BRPM

## 2018-03-17 PROCEDURE — 25010000002 IRON SUCROSE PER 1 MG: Performed by: OBSTETRICS & GYNECOLOGY

## 2018-03-17 RX ORDER — CYCLOBENZAPRINE HCL 10 MG
10 TABLET ORAL 3 TIMES DAILY
Qty: 63 TABLET | Refills: 0 | Status: SHIPPED | OUTPATIENT
Start: 2018-03-17 | End: 2018-04-07

## 2018-03-17 RX ORDER — ONDANSETRON 4 MG/1
4 TABLET, FILM COATED ORAL EVERY 8 HOURS PRN
Qty: 40 TABLET | Refills: 1 | Status: SHIPPED | OUTPATIENT
Start: 2018-03-17 | End: 2018-04-16

## 2018-03-17 RX ORDER — FERROUS SULFATE 325(65) MG
325 TABLET ORAL
Qty: 90 TABLET | Refills: 3 | Status: SHIPPED | OUTPATIENT
Start: 2018-03-17 | End: 2020-07-02

## 2018-03-17 RX ORDER — IBUPROFEN 800 MG/1
800 TABLET ORAL EVERY 6 HOURS PRN
Qty: 50 TABLET | Refills: 1 | Status: SHIPPED | OUTPATIENT
Start: 2018-03-17 | End: 2018-04-16

## 2018-03-17 RX ORDER — OXYCODONE AND ACETAMINOPHEN 10; 325 MG/1; MG/1
1 TABLET ORAL EVERY 4 HOURS PRN
Qty: 50 TABLET | Refills: 0 | Status: SHIPPED | OUTPATIENT
Start: 2018-03-17 | End: 2018-03-25

## 2018-03-17 RX ADMIN — CYCLOBENZAPRINE HYDROCHLORIDE 10 MG: 10 TABLET, FILM COATED ORAL at 10:13

## 2018-03-17 RX ADMIN — FERROUS SULFATE TAB 325 MG (65 MG ELEMENTAL FE) 325 MG: 325 (65 FE) TAB at 10:14

## 2018-03-17 RX ADMIN — PRENATAL VIT W/ FE FUMARATE-FA TAB 27-0.8 MG 1 TABLET: 27-0.8 TAB at 10:13

## 2018-03-17 RX ADMIN — IRON SUCROSE 200 MG: 20 INJECTION, SOLUTION INTRAVENOUS at 01:01

## 2018-03-17 RX ADMIN — OXYCODONE HYDROCHLORIDE AND ACETAMINOPHEN 1 TABLET: 10; 325 TABLET ORAL at 01:01

## 2018-03-17 NOTE — LACTATION NOTE
This note was copied from a baby's chart.  Infant girl day of life 2, 0.1% weight loss. Infant had 0.2 ml of EBM, formula fed x 9, 5 voids, 5 stools yesterday 3/16/18. Mom states has been pumping some. Education completed on breastfeeding discharge instructions, supply/demand, importance of pumping every 2 to 3 hours for at least 15 min, mom verbalized good understanding, denies any other questions or concerns at this time. Provided support and encouragement.

## 2018-03-17 NOTE — PLAN OF CARE
Problem: Patient Care Overview  Goal: Plan of Care Review   18 3666   Coping/Psychosocial   Plan of Care Reviewed With patient   Plan of Care Review   Progress improving   OTHER   Outcome Summary vital signs stable, voiding passing flatus, fundus firm midline, U1, lochia small, ambulating in room, hoping to go home tonight.     Goal: Individualization and Mutuality  Outcome: Ongoing (interventions implemented as appropriate)    Goal: Interprofessional Rounds/Family Conf  Outcome: Ongoing (interventions implemented as appropriate)      Problem: Skin Injury Risk (Adult)  Goal: Identify Related Risk Factors and Signs and Symptoms  Outcome: Ongoing (interventions implemented as appropriate)    Goal: Skin Health and Integrity  Outcome: Ongoing (interventions implemented as appropriate)      Problem: Postpartum ( Delivery) (Adult,Obstetrics,Pediatric)  Goal: Signs and Symptoms of Listed Potential Problems Will be Absent, Minimized or Managed (Postpartum)  Outcome: Ongoing (interventions implemented as appropriate)    Goal: Anesthesia/Sedation Recovery  Outcome: Ongoing (interventions implemented as appropriate)

## 2018-03-17 NOTE — PROGRESS NOTES
MICHELLE [facility]   PROGRESS NOTE    Post-Op Day 1 S/P   Subjective     Patient reports:  Pain is  Controlled.  She is ambulating. Tolerating diet. Tolerating po -- normal.  Intake -- c/o of tolerating po solids.   Voiding - without difficulty; flatus reported..  Vaginal bleeding is as much as expected.      Objective      Vitals: Vital Signs Range for the last 24 hours  Temperature: Temp:  [97.4 °F (36.3 °C)-98 °F (36.7 °C)] 98 °F (36.7 °C)   Temp Source: Temp src: Temporal Artery    BP: BP: (113-139)/(67-86) 122/70   Pulse: Heart Rate:  [] 90   Respirations: Resp:  [14-18] 16   SPO2: SpO2:  [93 %-98 %] 98 %   O2 Amount (l/min):     O2 Devices Device (Oxygen Therapy): room air   Weight:              Physical Exam    Lungs clear to auscultation bilaterally   Abdomen Soft, non-tender, normal bowel sounds; no bruits, organomegaly or masses.   Incision  healing well, no drainage, no erythema, no hernia, no seroma, no swelling, well approximated   Extremities edema pretibial edema     I reviewed the patient's new clinical results.    Assessment/Plan      Active Problems:    Pregnancy    Delayed delivery after SROM (spontaneous rupture of membranes)    PROM (premature rupture of membranes)      Assessment:    Bailee Phoenix is Day 1  post-partum  , Low Transverse    .       Plan:  remove dressing, remove urine catheter, saline lock IV fluids, advance diet  normal diet as tolerated and continue post op care.        Helen Cheng DO  3/16/2018  9:36 PM

## 2018-03-17 NOTE — DISCHARGE SUMMARY
[facility]  Delivery Discharge Summary  Bailee Phoenix    Primary OB Clinician: Prosper    EDC: Estimated Date of Delivery: 18    Gestational Age:37w0d    Antepartum complications:   PROM  Partial Abruption    Date of Delivery: 3/15/2018   Time of Delivery: 7:46 PM     Delivered By:  Helen Cheng     Delivery Type: , Low Transverse      Rh Immune globulin given: not applicable    Rubella vaccine given: not applicable    Discharge Date: 3/17/2018; Discharge Time: 3:31 PM    Early Discharge:  NO    Plan:    Address and phone number verified and same.  Follow-up appointment with Prosper on Wednesday

## 2018-03-17 NOTE — PLAN OF CARE
Problem: Patient Care Overview  Goal: Plan of Care Review  Outcome: Ongoing (interventions implemented as appropriate)   18   Coping/Psychosocial   Plan of Care Reviewed With patient   Plan of Care Review   Progress improving   OTHER   Outcome Summary VITALS STABLE, WANTS TO BREASTFEED, BUT HAS BOTTLE FED THIS SHIFT. PAIN MEDICATION GIVEN.      Goal: Individualization and Mutuality  Outcome: Ongoing (interventions implemented as appropriate)   18   Individualization   Patient Specific Preferences BREASTFEEDING AND SUPPLEMENTING WITH FORMULA     Goal: Discharge Needs Assessment  Outcome: Ongoing (interventions implemented as appropriate)   18   Discharge Needs Assessment   Readmission Within the Last 30 Days no previous admission in last 30 days   Concerns to be Addressed no discharge needs identified   Patient/Family Anticipates Transition to home   Patient/Family Anticipated Services at Transition none   Transportation Anticipated family or friend will provide   Anticipated Changes Related to Illness none   Equipment Needed After Discharge none   Disability   Equipment Currently Used at Home none       Problem: Skin Injury Risk (Adult)  Goal: Identify Related Risk Factors and Signs and Symptoms  Outcome: Ongoing (interventions implemented as appropriate)   18   Skin Injury Risk (Adult)   Related Risk Factors (Skin Injury Risk) ()     Goal: Skin Health and Integrity  Outcome: Ongoing (interventions implemented as appropriate)   18   Skin Injury Risk (Adult)   Skin Health and Integrity making progress toward outcome       Problem: Postpartum ( Delivery) (Adult,Obstetrics,Pediatric)  Goal: Signs and Symptoms of Listed Potential Problems Will be Absent, Minimized or Managed (Postpartum)  Outcome: Ongoing (interventions implemented as appropriate)   18   Goal/Outcome Evaluation   Problems Assessed (Postpartum ) all   Problems  Present (Postpartum ) none     Goal: Anesthesia/Sedation Recovery  Outcome: Ongoing (interventions implemented as appropriate)   18 0619   Goal/Outcome Evaluation   Anesthesia/Sedation Recovery ongoing sedation/anesthesia

## 2018-03-17 NOTE — OP NOTE
Operative Note  Bailee Phoenix    Pre Op Dx:  1.  Intrauterine pregnancy at 37 weeks  2.  Prolonged Premature Rupture Membranes (5days)  3.  Failure to Express  4.  Probable Concealed Abruption    Post Op Dx:  1.  Same  2.  Approximately 50% placental abruption  3.  Loose nuchal x 1    Procedure:  Primary  Section  Surgeon:  Prosper  Anesthesia:  Epidural  EBL:  1000 cc  Urine:  500 cc  IVF:  1000 cc  Pathology:  Placenta  Complications:  None    Indications:  This is a 27 year old female, with prolonged rupture of membranes  (5 days), diagnosed in labor and delivery the afternoon prior.  Pitocin was started and at maximum doses off and on for over 24 hours with maximum dilation of 4 cm.  Rupture of fore-bag with bloody fluid.  Patient did continue to express  Clots and heavy bleeding with little to no cervical change.  Given PROM and heavy bleeding high suspicion of concealed abruption  And failure to progress decision was made for surgical intervention at this time.    Findings:  Female infant, cephalic presentation, loose nuchal, 6-6 lbs  8/9 apgars.  Normal uterus tubes and ovaries.    Procedure:  Patient was taken to the OR where her epidural was adequately bolus for surgery.  She was placed in dorsal supine position with a leftward tilt.  She was prepped and draped.    A procedural time out was performed.  Using the knife a low transverse skin incision was made and then extended to the rectus fascia that was then extended laterally.  Clamps were placed on the fascia and the muscle was sharply dissected off and then bluntly seperated in the midline and the abdominal cavity was entered.    Bladder blade was inserted and using the second knife a low transverse hysterotomy incision was made a safe operating distance from the bladder reflection. The bladder blade was removed and the infants head was delivered, nose and mouth suctioned.  The torso was delivered and cord clamped and cut.  The infant was  handed off to awaiting  NP and nursery nurse.    The placenta had spontaneously  and was inspected with what appeared to be approximately 50% abruption.  Moderate amount of clot was removed from the uterine cavity.  Uterus was delivered out of the abdomen for repair.    The incision was repaired with O-vicryl in two layers.  First in a running locked fashion and then embricated to aid in hemostasis.  The uterus was placed back in the abdominal cavity.  Alecia was then applied along the incision to further aid in hemostasis.  A 4 x 4 piece of interceed was placed on the hysterotomy incision in hopes to prevent future adhesion formation.  The rectus muscles were then loosely re approximated.      The fascia was closed with a running stitch of O-vicryl.  The SQ tissue was re approximated with O-vicryl running.  The skin was closed with 4-O monocryl.  Sponge needle and instrument count was correct times 3.  Mother was taken to recovery room where spouse and baby were awaiting her arrival.

## 2018-03-18 NOTE — PAYOR COMM NOTE
"[id number]  NM HOME 3-17-18  Bailee Phoenix (27 y.o. Female)     Date of Birth Social Security Number Address Home Phone MRN    [id number]  [id number] [facility] [phone] [id number]    Protestant Marital Status          [facility] Single       Admission Date Admission Type Admitting Provider Attending Provider Department, Room/Bed    3/14/18 Elective Helen Cheng DO  [facility] MOTHER BABY 2A, M242/1    Discharge Date Discharge Disposition Discharge Destination        3/17/2018 Home or Self Care              Attending Provider:  (none)   Allergies:  No Known Allergies    Isolation:  None   Infection:  None   Code Status:  Prior    Ht:  160 cm (63\")   Wt:  78 kg (172 lb)    Admission Cmt:  None   Principal Problem:  None                Active Insurance as of 3/14/2018     Primary Coverage     Payor Plan Insurance Group Employer/Plan Group      [id number]       Payor Plan Address Payor Plan Phone Number Effective From Effective To    [address] [phone] 2018     [place] [address]       Subscriber Name Subscriber Birth Date Member ID       PATRICIABAILEE ALICE 1990 [id number]                 Emergency Contacts      (Rel.) Home Phone Work Phone Mobile Phone    Romero Phoenix (Spouse) -- -- [phone]               Discharge Summary      Helen Cheng DO at 3/17/2018  3:29 PM        [facility]  Delivery Discharge Summary  Bailee Phoenix    Primary OB Clinician: Prosper    EDC: Estimated Date of Delivery: 18    Gestational Age:37w0d    Antepartum complications:   PROM  Partial Abruption    Date of Delivery: 3/15/2018   Time of Delivery: 7:46 PM     Delivered By:  Helen Cheng     Delivery Type: , Low Transverse      Rh Immune globulin given: not applicable    Rubella vaccine given: not applicable    Discharge Date: 3/17/2018; Discharge Time: 3:31 PM    Early Discharge:  NO    Plan:    Address " and phone number verified and same.  Follow-up appointment with Prosper on Wednesday     Electronically signed by Helen Cheng DO at 3/17/2018  3:31 PM

## 2018-03-21 LAB
CYTO UR: NORMAL
LAB AP CASE REPORT: NORMAL
LAB AP CLINICAL INFORMATION: NORMAL
Lab: NORMAL
PATH REPORT.FINAL DX SPEC: NORMAL
PATH REPORT.GROSS SPEC: NORMAL

## 2020-01-30 ENCOUNTER — HOSPITAL ENCOUNTER (EMERGENCY)
Facility: HOSPITAL | Age: 30
Discharge: HOME | End: 2020-01-30
Attending: FAMILY MEDICINE | Admitting: FAMILY MEDICINE

## 2020-01-30 ENCOUNTER — APPOINTMENT (OUTPATIENT)
Dept: CT IMAGING | Facility: HOSPITAL | Age: 30
End: 2020-01-30

## 2020-01-30 VITALS
SYSTOLIC BLOOD PRESSURE: 121 MMHG | WEIGHT: 160 LBS | DIASTOLIC BLOOD PRESSURE: 84 MMHG | HEIGHT: 63 IN | HEART RATE: 128 BPM | RESPIRATION RATE: 16 BRPM | TEMPERATURE: 98.4 F | BODY MASS INDEX: 28.35 KG/M2 | OXYGEN SATURATION: 100 %

## 2020-01-30 DIAGNOSIS — K52.9 GASTROENTERITIS: Primary | ICD-10-CM

## 2020-01-30 LAB
ALBUMIN SERPL-MCNC: 4.7 G/DL (ref 3.5–5.2)
ALBUMIN/GLOB SERPL: 1.5 G/DL
ALP SERPL-CCNC: 82 U/L (ref 39–117)
ALT SERPL W P-5'-P-CCNC: 14 U/L (ref 1–33)
ANION GAP SERPL CALCULATED.3IONS-SCNC: 12 MMOL/L (ref 5–15)
AST SERPL-CCNC: 17 U/L (ref 1–32)
B-HCG UR QL: NEGATIVE
BASOPHILS # BLD AUTO: 0.05 10*3/MM3 (ref 0–0.2)
BASOPHILS NFR BLD AUTO: 0.4 % (ref 0–1.5)
BILIRUB SERPL-MCNC: 0.7 MG/DL (ref 0.2–1.2)
BILIRUB UR QL STRIP: NEGATIVE
BUN BLD-MCNC: 23 MG/DL (ref 6–20)
BUN/CREAT SERPL: 30.3 (ref 7–25)
CALCIUM SPEC-SCNC: 9 MG/DL (ref 8.6–10.5)
CHLORIDE SERPL-SCNC: 104 MMOL/L (ref 98–107)
CLARITY UR: CLEAR
CO2 SERPL-SCNC: 27 MMOL/L (ref 22–29)
COLOR UR: ABNORMAL
CREAT BLD-MCNC: 0.76 MG/DL (ref 0.57–1)
DEPRECATED RDW RBC AUTO: 39.7 FL (ref 37–54)
EOSINOPHIL # BLD AUTO: 0.1 10*3/MM3 (ref 0–0.4)
EOSINOPHIL NFR BLD AUTO: 0.8 % (ref 0.3–6.2)
ERYTHROCYTE [DISTWIDTH] IN BLOOD BY AUTOMATED COUNT: 13.2 % (ref 12.3–15.4)
GFR SERPL CREATININE-BSD FRML MDRD: 91 ML/MIN/1.73
GLOBULIN UR ELPH-MCNC: 3.2 GM/DL
GLUCOSE BLD-MCNC: 129 MG/DL (ref 65–99)
GLUCOSE UR STRIP-MCNC: NEGATIVE MG/DL
HCT VFR BLD AUTO: 39.6 % (ref 34–46.6)
HGB BLD-MCNC: 13.4 G/DL (ref 12–15.9)
HGB UR QL STRIP.AUTO: NEGATIVE
HOLD SPECIMEN: NORMAL
HOLD SPECIMEN: NORMAL
IMM GRANULOCYTES # BLD AUTO: 0.04 10*3/MM3 (ref 0–0.05)
IMM GRANULOCYTES NFR BLD AUTO: 0.3 % (ref 0–0.5)
KETONES UR QL STRIP: ABNORMAL
LEUKOCYTE ESTERASE UR QL STRIP.AUTO: NEGATIVE
LYMPHOCYTES # BLD AUTO: 0.56 10*3/MM3 (ref 0.7–3.1)
LYMPHOCYTES NFR BLD AUTO: 4.5 % (ref 19.6–45.3)
MCH RBC QN AUTO: 28 PG (ref 26.6–33)
MCHC RBC AUTO-ENTMCNC: 33.8 G/DL (ref 31.5–35.7)
MCV RBC AUTO: 82.8 FL (ref 79–97)
MONOCYTES # BLD AUTO: 0.61 10*3/MM3 (ref 0.1–0.9)
MONOCYTES NFR BLD AUTO: 4.9 % (ref 5–12)
NEUTROPHILS # BLD AUTO: 11.12 10*3/MM3 (ref 1.7–7)
NEUTROPHILS NFR BLD AUTO: 89.1 % (ref 42.7–76)
NITRITE UR QL STRIP: NEGATIVE
NRBC BLD AUTO-RTO: 0 /100 WBC (ref 0–0.2)
PH UR STRIP.AUTO: 6.5 [PH] (ref 5–8)
PLATELET # BLD AUTO: 274 10*3/MM3 (ref 140–450)
PMV BLD AUTO: 10.9 FL (ref 6–12)
POTASSIUM BLD-SCNC: 3.8 MMOL/L (ref 3.5–5.2)
PROT SERPL-MCNC: 7.9 G/DL (ref 6–8.5)
PROT UR QL STRIP: NEGATIVE
RBC # BLD AUTO: 4.78 10*6/MM3 (ref 3.77–5.28)
SODIUM BLD-SCNC: 143 MMOL/L (ref 136–145)
SP GR UR STRIP: 1.03 (ref 1–1.03)
UROBILINOGEN UR QL STRIP: ABNORMAL
WBC NRBC COR # BLD: 12.48 10*3/MM3 (ref 3.4–10.8)
WHOLE BLOOD HOLD SPECIMEN: NORMAL
WHOLE BLOOD HOLD SPECIMEN: NORMAL

## 2020-01-30 PROCEDURE — 81003 URINALYSIS AUTO W/O SCOPE: CPT | Performed by: FAMILY MEDICINE

## 2020-01-30 PROCEDURE — 81025 URINE PREGNANCY TEST: CPT | Performed by: FAMILY MEDICINE

## 2020-01-30 PROCEDURE — 96374 THER/PROPH/DIAG INJ IV PUSH: CPT

## 2020-01-30 PROCEDURE — 96375 TX/PRO/DX INJ NEW DRUG ADDON: CPT

## 2020-01-30 PROCEDURE — 85025 COMPLETE CBC W/AUTO DIFF WBC: CPT | Performed by: FAMILY MEDICINE

## 2020-01-30 PROCEDURE — 99283 EMERGENCY DEPT VISIT LOW MDM: CPT

## 2020-01-30 PROCEDURE — 80053 COMPREHEN METABOLIC PANEL: CPT | Performed by: FAMILY MEDICINE

## 2020-01-30 PROCEDURE — 25010000002 KETOROLAC TROMETHAMINE PER 15 MG: Performed by: FAMILY MEDICINE

## 2020-01-30 PROCEDURE — 74176 CT ABD & PELVIS W/O CONTRAST: CPT

## 2020-01-30 PROCEDURE — 25010000002 METOCLOPRAMIDE PER 10 MG: Performed by: FAMILY MEDICINE

## 2020-01-30 RX ORDER — METOCLOPRAMIDE 5 MG/1
5 TABLET ORAL 3 TIMES DAILY PRN
Qty: 10 TABLET | Refills: 0 | Status: SHIPPED | OUTPATIENT
Start: 2020-01-30 | End: 2020-12-11

## 2020-01-30 RX ORDER — KETOROLAC TROMETHAMINE 15 MG/ML
15 INJECTION, SOLUTION INTRAMUSCULAR; INTRAVENOUS ONCE
Status: COMPLETED | OUTPATIENT
Start: 2020-01-30 | End: 2020-01-30

## 2020-01-30 RX ORDER — METOCLOPRAMIDE HYDROCHLORIDE 5 MG/ML
10 INJECTION INTRAMUSCULAR; INTRAVENOUS ONCE
Status: COMPLETED | OUTPATIENT
Start: 2020-01-30 | End: 2020-01-30

## 2020-01-30 RX ADMIN — KETOROLAC TROMETHAMINE 15 MG: 15 INJECTION, SOLUTION INTRAMUSCULAR; INTRAVENOUS at 19:59

## 2020-01-30 RX ADMIN — METOCLOPRAMIDE 10 MG: 5 INJECTION, SOLUTION INTRAMUSCULAR; INTRAVENOUS at 19:59

## 2020-01-30 RX ADMIN — SODIUM CHLORIDE 1000 ML: 9 INJECTION, SOLUTION INTRAVENOUS at 20:26

## 2020-07-02 ENCOUNTER — OFFICE VISIT (OUTPATIENT)
Dept: OBSTETRICS AND GYNECOLOGY | Facility: CLINIC | Age: 30
End: 2020-07-02

## 2020-07-02 VITALS
BODY MASS INDEX: 29.41 KG/M2 | WEIGHT: 166 LBS | SYSTOLIC BLOOD PRESSURE: 126 MMHG | HEIGHT: 63 IN | DIASTOLIC BLOOD PRESSURE: 64 MMHG

## 2020-07-02 DIAGNOSIS — Z31.69 PRE-CONCEPTION COUNSELING: ICD-10-CM

## 2020-07-02 DIAGNOSIS — Z01.419 WELL WOMAN EXAM WITH ROUTINE GYNECOLOGICAL EXAM: Primary | ICD-10-CM

## 2020-07-02 DIAGNOSIS — N89.8 VAGINAL DISCHARGE: ICD-10-CM

## 2020-07-02 PROCEDURE — 87798 DETECT AGENT NOS DNA AMP: CPT | Performed by: NURSE PRACTITIONER

## 2020-07-02 PROCEDURE — 87661 TRICHOMONAS VAGINALIS AMPLIF: CPT | Performed by: NURSE PRACTITIONER

## 2020-07-02 PROCEDURE — 87481 CANDIDA DNA AMP PROBE: CPT | Performed by: NURSE PRACTITIONER

## 2020-07-02 PROCEDURE — 87512 GARDNER VAG DNA QUANT: CPT | Performed by: NURSE PRACTITIONER

## 2020-07-02 PROCEDURE — G0123 SCREEN CERV/VAG THIN LAYER: HCPCS | Performed by: NURSE PRACTITIONER

## 2020-07-02 PROCEDURE — 87563 M. GENITALIUM AMP PROBE: CPT | Performed by: NURSE PRACTITIONER

## 2020-07-02 PROCEDURE — 99385 PREV VISIT NEW AGE 18-39: CPT | Performed by: NURSE PRACTITIONER

## 2020-07-02 RX ORDER — VITAMIN C, CALCIUM, IRON, VITAMIN D3, VITAMIN E, VITAMIN B1, VITAMIN B2, VITAMIN B3, VITAMIN B6, FOLIC ACID, IODINE, ZINC, COPPER, DOCUSATE SODIUM, DOCOSAHEXAENOIC ACID (DHA) 27-1-50 MG
1 KIT ORAL DAILY
Qty: 30 EACH | Refills: 12 | Status: SHIPPED | OUTPATIENT
Start: 2020-07-02

## 2020-07-02 RX ORDER — ERGOCALCIFEROL 1.25 MG/1
CAPSULE ORAL
COMMUNITY
Start: 2020-04-27 | End: 2020-12-11

## 2020-07-02 NOTE — PATIENT INSTRUCTIONS

## 2020-07-08 LAB — TRICHOMONAS VAGINALIS PCR: NOT DETECTED

## 2020-07-08 RX ORDER — FLUCONAZOLE 150 MG/1
150 TABLET ORAL ONCE
Qty: 2 TABLET | Refills: 0 | Status: SHIPPED | OUTPATIENT
Start: 2020-07-08 | End: 2020-07-08

## 2020-07-13 LAB
GEN CATEG CVX/VAG CYTO-IMP: NORMAL
LAB AP CASE REPORT: NORMAL
LAB AP GYN OTHER FINDINGS: NORMAL
PATH INTERP SPEC-IMP: NORMAL
STAT OF ADQ CVX/VAG CYTO-IMP: NORMAL

## 2020-07-13 RX ORDER — FLUCONAZOLE 150 MG/1
150 TABLET ORAL ONCE
Qty: 2 TABLET | Refills: 0 | Status: SHIPPED | OUTPATIENT
Start: 2020-07-13 | End: 2020-07-13

## 2020-07-13 RX ORDER — METRONIDAZOLE 500 MG/1
500 TABLET ORAL 2 TIMES DAILY
Qty: 14 TABLET | Refills: 0 | Status: SHIPPED | OUTPATIENT
Start: 2020-07-13 | End: 2020-07-20

## 2020-09-13 ENCOUNTER — APPOINTMENT (OUTPATIENT)
Dept: GENERAL RADIOLOGY | Facility: HOSPITAL | Age: 30
End: 2020-09-13

## 2020-09-13 ENCOUNTER — HOSPITAL ENCOUNTER (EMERGENCY)
Facility: HOSPITAL | Age: 30
Discharge: HOME | End: 2020-09-13
Attending: EMERGENCY MEDICINE | Admitting: EMERGENCY MEDICINE

## 2020-09-13 VITALS
HEART RATE: 81 BPM | DIASTOLIC BLOOD PRESSURE: 89 MMHG | BODY MASS INDEX: 28.88 KG/M2 | SYSTOLIC BLOOD PRESSURE: 148 MMHG | HEIGHT: 63 IN | TEMPERATURE: 98.4 F | RESPIRATION RATE: 17 BRPM | OXYGEN SATURATION: 99 % | WEIGHT: 163 LBS

## 2020-09-13 DIAGNOSIS — S92.331A DISPLACED FRACTURE OF THIRD METATARSAL BONE, RIGHT FOOT, INITIAL ENCOUNTER FOR CLOSED FRACTURE: ICD-10-CM

## 2020-09-13 DIAGNOSIS — S82.831A CLOSED FRACTURE OF DISTAL END OF RIGHT FIBULA, UNSPECIFIED FRACTURE MORPHOLOGY, INITIAL ENCOUNTER: ICD-10-CM

## 2020-09-13 DIAGNOSIS — S92.321A CLOSED DISPLACED FRACTURE OF SECOND METATARSAL BONE OF RIGHT FOOT, INITIAL ENCOUNTER: ICD-10-CM

## 2020-09-13 DIAGNOSIS — S92.341A DISPLACED FRACTURE OF FOURTH METATARSAL BONE, RIGHT FOOT, INITIAL ENCOUNTER FOR CLOSED FRACTURE: ICD-10-CM

## 2020-09-13 DIAGNOSIS — S92.351A DISPLACED FRACTURE OF FIFTH METATARSAL BONE, RIGHT FOOT, INITIAL ENCOUNTER FOR CLOSED FRACTURE: ICD-10-CM
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
## 2020-09-13 DIAGNOSIS — M25.571 ACUTE RIGHT ANKLE PAIN: Primary | ICD-10-CM

## 2020-09-13 PROCEDURE — 73590 X-RAY EXAM OF LOWER LEG: CPT

## 2020-09-13 PROCEDURE — 73610 X-RAY EXAM OF ANKLE: CPT

## 2020-09-13 PROCEDURE — 73630 X-RAY EXAM OF FOOT: CPT

## 2020-09-13 PROCEDURE — 99283 EMERGENCY DEPT VISIT LOW MDM: CPT

## 2020-09-13 RX ORDER — HYDROCODONE BITARTRATE AND ACETAMINOPHEN 5; 325 MG/1; MG/1
1 TABLET ORAL ONCE
Status: COMPLETED | OUTPATIENT
Start: 2020-09-13 | End: 2020-09-13

## 2020-09-13 RX ORDER — HYDROCODONE BITARTRATE AND ACETAMINOPHEN 5; 325 MG/1; MG/1
1 TABLET ORAL EVERY 6 HOURS PRN
Qty: 8 TABLET | Refills: 0 | Status: SHIPPED | OUTPATIENT
Start: 2020-09-13 | End: 2020-09-15

## 2020-09-13 RX ADMIN — HYDROCODONE BITARTRATE AND ACETAMINOPHEN 1 TABLET: 5; 325 TABLET ORAL at 13:31

## 2020-09-13 NOTE — ED PROVIDER NOTES
Subjective   This is a 30-year-old who denies any significant past medical history presents the emergency department chief complaint of right ankle pain.  Just prior to arrival she fell down the steps at [employer].  She is sure this was a mechanical fall.  She inverted her right ankle during the fall and had sudden onset pain that sharp.  Moderate.  Radiates up her leg.  Worse with walking, better with rest.  States she did strike her head but it was mild.  Denies any headache or vision changes.  Has no neck pain, back pain, chest pain, shortness breath, abdominal pain, nausea, vomiting.  No pain in any of her other extremities.  She does have a small abrasion on her right elbow but states this does not hurt.  She has no numbness, weakness, or paresthesias.    Past medical history: Denies  Medications: Denies any blood thinners or antiplatelet agents.  No regular use of over-the-counter medications  Social history: Denies any illicit drugs      History provided by:  Patient      Review of Systems   All other systems reviewed and are negative.      Past Medical History:   Diagnosis Date   • Kidney stone    • Migraine        No Known Allergies    Past Surgical History:   Procedure Laterality Date   • APPENDECTOMY     •  SECTION N/A 3/15/2018    Procedure:  SECTION PRIMARY;  Surgeon: Helen Cheng DO;  Location: [facility] LABOR DELIVERY;  Service: Obstetrics/Gynecology   • CHOLECYSTECTOMY     • DILATATION AND CURETTAGE     • KNEE ACL RECONSTRUCTION         Family History   Problem Relation Age of Onset   • Endometriosis Mother    • No Known Problems Brother    • Endometriosis Sister    • No Known Problems Brother    • No Known Problems Brother    • Breast cancer Neg Hx    • Ovarian cancer Neg Hx    • Uterine cancer Neg Hx    • Colon cancer Neg Hx    • Melanoma Neg Hx    • Prostate cancer Neg Hx        Social History     Socioeconomic History   • Marital status: Single     Spouse name: Not on file   •  Number of children: Not on file   • Years of education: Not on file   • Highest education level: Not on file   Tobacco Use   • Smoking status: Never Smoker   • Smokeless tobacco: Never Used   Substance and Sexual Activity   • Alcohol use: No   • Drug use: No   • Sexual activity: Yes     Partners: Male     Birth control/protection: None           Objective   Physical Exam  Vitals signs and nursing note reviewed.   Constitutional:       General: She is not in acute distress.     Appearance: Normal appearance. She is normal weight. She is not ill-appearing, toxic-appearing or diaphoretic.   HENT:      Head: Normocephalic and atraumatic.      Nose: Nose normal.      Mouth/Throat:      Mouth: Mucous membranes are moist.   Eyes:      Extraocular Movements: Extraocular movements intact.      Pupils: Pupils are equal, round, and reactive to light.   Neck:      Musculoskeletal: Normal range of motion and neck supple. No muscular tenderness.   Cardiovascular:      Rate and Rhythm: Normal rate and regular rhythm.      Pulses: Normal pulses.      Heart sounds: Normal heart sounds. No murmur. No friction rub. No gallop.    Pulmonary:      Effort: Pulmonary effort is normal. No respiratory distress.      Breath sounds: Normal breath sounds. No stridor. No wheezing, rhonchi or rales.   Abdominal:      General: Abdomen is flat. Bowel sounds are normal. There is no distension.      Palpations: There is no mass.      Tenderness: There is no abdominal tenderness. There is no guarding or rebound.      Hernia: No hernia is present.   Musculoskeletal: Normal range of motion.         General: Tenderness present. No swelling.      Comments: Patient has swelling to the lateral right ankle.  Tenderness to palpation of the distal lateral malleolus.  No medial malleolar tenderness.  No midfoot tenderness.  Strong DP pulse.  Normal capillary refill.  No tenderness on the bottom of the foot.  No medial tenderness of the top of the foot.  Normal  sensation to light touch.  No proximal tib-fib tenderness.  Baires's test is normal.  No tenderness to palpation of the spine or all other extremities.  Full range of motion of the right elbow.  Small abrasion over the right elbow.   Skin:     General: Skin is warm and dry.      Capillary Refill: Capillary refill takes less than 2 seconds.      Coloration: Skin is not jaundiced or pale.      Findings: No bruising, erythema, lesion or rash.   Neurological:      General: No focal deficit present.      Mental Status: She is alert and oriented to person, place, and time.   Psychiatric:         Mood and Affect: Mood normal.         Behavior: Behavior normal.         Thought Content: Thought content normal.         Judgment: Judgment normal.         Procedures           ED Course                                           MDM  Number of Diagnoses or Management Options  Acute right ankle pain: new and requires workup  Closed displaced fracture of second metatarsal bone of right foot, initial encounter: new and requires workup  Closed fracture of distal end of right fibula, unspecified fracture morphology, initial encounter: new and requires workup  Displaced fracture of fifth metatarsal bone, right foot, initial encounter for closed fracture: new and requires workup  Displaced fracture of fourth metatarsal bone, right foot, initial encounter for closed fracture: new and requires workup  Displaced fracture of third metatarsal bone, right foot, initial encounter for closed fracture: new and requires workup  Fall, initial encounter: new and requires workup  Diagnosis management comments: Patient presents with right ankle pain after fall.  Upon arrival in no acute distress vital signs are reassuring.  She is given Norco for pain control and is evaluated with radiographs of the right ankle, foot, and tib-fib.  Low concern for head injury with only a small hit.  She is not on any blood thinners or antiplatelet agents.  She has  no headache or vision changes.  She has normal neurologic exam.  Cannot exclude C-spine injury using Nexus criteria.  Low concern for thoracolumbar injury with no pain, no tenderness, no neurologic symptoms.  Low concern for chest injury with no chest pain, tenderness, or shortness of breath.  Low concern for intra-abdominal injury with no abdominal pain, tenderness, nausea, vomiting.  Low concern for injury to the other extremities with no signs of trauma no complaints of pain.  She does have a small abrasion.  She states this does not hurt.  She is sure she has had a tetanus shot in the past 5 years.  Low concern for Lisfranc injury with no ecchymosis on the plantar medial foot, no tenderness at the site of the Lisfranc joints, no medial tenderness.  Does not describe plantarflexed eversion injury.  Low concern for maissoneuve fracture with no findings of this on radiographsand no proximal tenderness.Her radiographs today show distal fibula fracture as well as fractures of her second, third, fourth, and fifth distal metatarsals.  Due to this I discussed the case with Dr. Abebe.  We will place the patient in a [place] splint.  She will see Dr. Abebe in office on Tuesday.  She is okay with this plan.  She is discharged in good condition normal vital signs and is given commonsense return precautions which she verbalizes understanding of.  After her splint was placed I performed a full neurovascular exam.  She has normal capillary refill, strong DP pulse, normal sensation to light touch in her toes.  Her pain is controlled.       Amount and/or Complexity of Data Reviewed  Tests in the radiology section of CPT®: ordered and reviewed  Discuss the patient with other providers: yes (Dr. Abebe)    Risk of Complications, Morbidity, and/or Mortality  Presenting problems: high  Diagnostic procedures: low  Management options: high        Final diagnoses:   Acute right ankle pain   Fall, initial encounter   Closed fracture of  distal end of right fibula, unspecified fracture morphology, initial encounter   Closed displaced fracture of second metatarsal bone of right foot, initial encounter   Displaced fracture of third metatarsal bone, right foot, initial encounter for closed fracture   Displaced fracture of fourth metatarsal bone, right foot, initial encounter for closed fracture   Displaced fracture of fifth metatarsal bone, right foot, initial encounter for closed fracture            Rafat Momin MD  09/13/20 1527

## 2020-09-13 NOTE — DISCHARGE INSTRUCTIONS
Please return immediately to the emergency department for any new or worsening symptoms. Please see Dr. Mariee on Tuesday 1230.

## 2020-12-11 ENCOUNTER — INITIAL PRENATAL (OUTPATIENT)
Dept: OBSTETRICS AND GYNECOLOGY | Facility: CLINIC | Age: 30
End: 2020-12-11

## 2020-12-11 VITALS — BODY MASS INDEX: 30.11 KG/M2 | WEIGHT: 170 LBS | SYSTOLIC BLOOD PRESSURE: 120 MMHG | DIASTOLIC BLOOD PRESSURE: 72 MMHG

## 2020-12-11 DIAGNOSIS — Z3A.01 LESS THAN 8 WEEKS GESTATION OF PREGNANCY: Primary | ICD-10-CM

## 2020-12-11 PROCEDURE — 0501F PRENATAL FLOW SHEET: CPT | Performed by: OBSTETRICS & GYNECOLOGY

## 2020-12-11 RX ORDER — ONDANSETRON 4 MG/1
TABLET, ORALLY DISINTEGRATING ORAL
COMMUNITY
Start: 2020-12-05 | End: 2021-06-18

## 2020-12-11 NOTE — PROGRESS NOTES
at 7w2d here for initial prenatal care visit.   ObHx: CSX1, SABx1; D&Cx1  GynHx:   PMH: history of kidney stones  PSh: CSX1, D&Cx1; ACL right knee, gallbladder and appendix   NKDA   PNV, Zofran   SH: denies drinking, smoking or drug use   PE see above   A/P  at 7w2d   Desires repeat  section   RTC in 4 weeks   Desires panorama   Miscarriage precautions   New ob labs  New ob information

## 2020-12-16 LAB
ABO GROUP BLD: NORMAL
BACTERIA UR CULT: NO GROWTH
BACTERIA UR CULT: NORMAL
BASOPHILS # BLD AUTO: 0.04 10*3/MM3 (ref 0–0.2)
BASOPHILS NFR BLD AUTO: 0.5 % (ref 0–1.5)
BLD GP AB SCN SERPL QL: NEGATIVE
C TRACH RRNA SPEC QL NAA+PROBE: NEGATIVE
DRUGS UR: NORMAL
EOSINOPHIL # BLD AUTO: 0.07 10*3/MM3 (ref 0–0.4)
EOSINOPHIL NFR BLD AUTO: 0.8 % (ref 0.3–6.2)
ERYTHROCYTE [DISTWIDTH] IN BLOOD BY AUTOMATED COUNT: 12.7 % (ref 12.3–15.4)
HBV SURFACE AG SERPL QL IA: NEGATIVE
HCT VFR BLD AUTO: 34.5 % (ref 34–46.6)
HGB BLD-MCNC: 11.6 G/DL (ref 12–15.9)
HIV 1+2 AB+HIV1 P24 AG SERPL QL IA: NON REACTIVE
IMM GRANULOCYTES # BLD AUTO: 0.02 10*3/MM3 (ref 0–0.05)
IMM GRANULOCYTES NFR BLD AUTO: 0.2 % (ref 0–0.5)
LYMPHOCYTES # BLD AUTO: 1.63 10*3/MM3 (ref 0.7–3.1)
LYMPHOCYTES NFR BLD AUTO: 19.5 % (ref 19.6–45.3)
MCH RBC QN AUTO: 29 PG (ref 26.6–33)
MCHC RBC AUTO-ENTMCNC: 33.6 G/DL (ref 31.5–35.7)
MCV RBC AUTO: 86.3 FL (ref 79–97)
MONOCYTES # BLD AUTO: 0.57 10*3/MM3 (ref 0.1–0.9)
MONOCYTES NFR BLD AUTO: 6.8 % (ref 5–12)
N GONORRHOEA RRNA SPEC QL NAA+PROBE: NEGATIVE
NEUTROPHILS # BLD AUTO: 6.02 10*3/MM3 (ref 1.7–7)
NEUTROPHILS NFR BLD AUTO: 72.2 % (ref 42.7–76)
NRBC BLD AUTO-RTO: 0 /100 WBC (ref 0–0.2)
PLATELET # BLD AUTO: 258 10*3/MM3 (ref 140–450)
RBC # BLD AUTO: 4 10*6/MM3 (ref 3.77–5.28)
RH BLD: POSITIVE
RPR SER QL: NON REACTIVE
RUBV IGG SERPL IA-ACNC: 2.43 INDEX
WBC # BLD AUTO: 8.35 10*3/MM3 (ref 3.4–10.8)

## 2021-01-12 ENCOUNTER — ROUTINE PRENATAL (OUTPATIENT)
Dept: OBSTETRICS AND GYNECOLOGY | Facility: CLINIC | Age: 31
End: 2021-01-12

## 2021-01-12 VITALS — DIASTOLIC BLOOD PRESSURE: 70 MMHG | SYSTOLIC BLOOD PRESSURE: 118 MMHG | BODY MASS INDEX: 31 KG/M2 | WEIGHT: 175 LBS

## 2021-01-12 DIAGNOSIS — Z3A.11 11 WEEKS GESTATION OF PREGNANCY: Primary | ICD-10-CM

## 2021-01-12 LAB
GLUCOSE UR STRIP-MCNC: NEGATIVE MG/DL
PROT UR STRIP-MCNC: NEGATIVE MG/DL

## 2021-01-12 PROCEDURE — 0502F SUBSEQUENT PRENATAL CARE: CPT | Performed by: OBSTETRICS & GYNECOLOGY

## 2021-01-12 NOTE — PROGRESS NOTES
30 year old female  at 11.6 IUP presents for follow up prenatal care visit. No obstetrical complaints. Reports her nausea and vomiting are getting better.   PE see above   A/P  at 11.6 IUP   RTC In 4 weeks   PEEK at next visit   Desires sterilization.   Got flu shot   Miscarriage precautions

## 2021-02-09 ENCOUNTER — ROUTINE PRENATAL (OUTPATIENT)
Dept: OBSTETRICS AND GYNECOLOGY | Facility: CLINIC | Age: 31
End: 2021-02-09

## 2021-02-09 VITALS — SYSTOLIC BLOOD PRESSURE: 108 MMHG | DIASTOLIC BLOOD PRESSURE: 66 MMHG | BODY MASS INDEX: 28.87 KG/M2 | WEIGHT: 163 LBS

## 2021-02-09 DIAGNOSIS — Z3A.15 15 WEEKS GESTATION OF PREGNANCY: Primary | ICD-10-CM

## 2021-02-09 LAB
GLUCOSE UR STRIP-MCNC: NEGATIVE MG/DL
PROT UR STRIP-MCNC: NEGATIVE MG/DL

## 2021-02-09 PROCEDURE — 0502F SUBSEQUENT PRENATAL CARE: CPT | Performed by: OBSTETRICS & GYNECOLOGY

## 2021-02-09 RX ORDER — METOCLOPRAMIDE 10 MG/1
10 TABLET ORAL 4 TIMES DAILY
Qty: 120 TABLET | Refills: 1 | Status: SHIPPED | OUTPATIENT
Start: 2021-02-09 | End: 2021-08-05

## 2021-02-09 RX ORDER — METOCLOPRAMIDE 10 MG/1
10 TABLET ORAL 4 TIMES DAILY
Qty: 120 TABLET | Refills: 1 | Status: SHIPPED | OUTPATIENT
Start: 2021-02-09 | End: 2021-02-09

## 2021-02-09 NOTE — PROGRESS NOTES
30 year old female  at 15.6 IUP presents for follow up prenatal care visit. She has no obstetrical complaints at this time.   PE see above   A/P  at 15.6 IUP   RTC in 4 weeks   Discussed weight loss at this time   Miscarriage precautions

## 2021-03-09 ENCOUNTER — ROUTINE PRENATAL (OUTPATIENT)
Dept: OBSTETRICS AND GYNECOLOGY | Facility: CLINIC | Age: 31
End: 2021-03-09

## 2021-03-09 VITALS — SYSTOLIC BLOOD PRESSURE: 102 MMHG | DIASTOLIC BLOOD PRESSURE: 78 MMHG | BODY MASS INDEX: 29.05 KG/M2 | WEIGHT: 164 LBS

## 2021-03-09 DIAGNOSIS — Z3A.19 19 WEEKS GESTATION OF PREGNANCY: Primary | ICD-10-CM

## 2021-03-09 LAB
GLUCOSE UR STRIP-MCNC: NEGATIVE MG/DL
PROT UR STRIP-MCNC: NEGATIVE MG/DL

## 2021-03-09 PROCEDURE — 0502F SUBSEQUENT PRENATAL CARE: CPT | Performed by: OBSTETRICS & GYNECOLOGY

## 2021-03-09 NOTE — PROGRESS NOTES
at 19.6 IUP presents for follow up. No obstetrical complaints.   PE see above   A?P  at19.6 IUP   RTC in 4 weeks   Miscarriage precautions   Anatomy son scheduled

## 2021-04-06 ENCOUNTER — ROUTINE PRENATAL (OUTPATIENT)
Dept: OBSTETRICS AND GYNECOLOGY | Facility: CLINIC | Age: 31
End: 2021-04-06

## 2021-04-06 VITALS — DIASTOLIC BLOOD PRESSURE: 66 MMHG | WEIGHT: 168 LBS | BODY MASS INDEX: 29.76 KG/M2 | SYSTOLIC BLOOD PRESSURE: 110 MMHG

## 2021-04-06 DIAGNOSIS — Z3A.23 23 WEEKS GESTATION OF PREGNANCY: Primary | ICD-10-CM

## 2021-04-06 LAB
GLUCOSE UR STRIP-MCNC: NEGATIVE MG/DL
PROT UR STRIP-MCNC: NEGATIVE MG/DL

## 2021-04-06 PROCEDURE — 0502F SUBSEQUENT PRENATAL CARE: CPT | Performed by: OBSTETRICS & GYNECOLOGY

## 2021-04-06 NOTE — PROGRESS NOTES
at 23.6 IUP presents for follow up. No obstetrical complaints. Anatomy sono showed MCI.   PE see above   A/P  at 23.6 IUP   RTC in 4 weeks   PTL Precautions   GCT and hemoglobin next visit   Will check growth next visit secondary to marginal cord insertion.

## 2021-05-04 ENCOUNTER — ROUTINE PRENATAL (OUTPATIENT)
Dept: OBSTETRICS AND GYNECOLOGY | Facility: CLINIC | Age: 31
End: 2021-05-04

## 2021-05-04 VITALS — WEIGHT: 172 LBS | SYSTOLIC BLOOD PRESSURE: 112 MMHG | DIASTOLIC BLOOD PRESSURE: 70 MMHG | BODY MASS INDEX: 30.47 KG/M2

## 2021-05-04 DIAGNOSIS — Z34.93 PRENATAL CARE IN THIRD TRIMESTER: Primary | ICD-10-CM

## 2021-05-04 LAB
GLUCOSE UR STRIP-MCNC: NEGATIVE MG/DL
PROT UR STRIP-MCNC: NEGATIVE MG/DL

## 2021-05-04 PROCEDURE — 0502F SUBSEQUENT PRENATAL CARE: CPT | Performed by: OBSTETRICS & GYNECOLOGY

## 2021-05-04 NOTE — PROGRESS NOTES
at 27.6 IUP Presents for follow up prenatal care visit. No obstetrical complaints.   PE see above   A/P  at 27.6 IUP   RTC in 2 weeks   4d US   Hemoglobin and GCT today   Discussed tdap

## 2021-05-05 LAB
GLUCOSE 1H P 50 G GLC PO SERPL-MCNC: 127 MG/DL (ref 65–139)
HGB BLD-MCNC: 10.4 G/DL (ref 12–15.9)

## 2021-05-18 ENCOUNTER — ROUTINE PRENATAL (OUTPATIENT)
Dept: OBSTETRICS AND GYNECOLOGY | Facility: CLINIC | Age: 31
End: 2021-05-18

## 2021-05-18 VITALS — WEIGHT: 171 LBS | DIASTOLIC BLOOD PRESSURE: 72 MMHG | SYSTOLIC BLOOD PRESSURE: 116 MMHG | BODY MASS INDEX: 30.29 KG/M2

## 2021-05-18 DIAGNOSIS — N89.8 VAGINAL DISCHARGE DURING PREGNANCY IN THIRD TRIMESTER: Primary | ICD-10-CM

## 2021-05-18 DIAGNOSIS — Z34.93 PRENATAL CARE IN THIRD TRIMESTER: ICD-10-CM

## 2021-05-18 DIAGNOSIS — O26.893 VAGINAL DISCHARGE DURING PREGNANCY IN THIRD TRIMESTER: Primary | ICD-10-CM

## 2021-05-18 DIAGNOSIS — Z98.891 PREVIOUS CESAREAN SECTION: ICD-10-CM

## 2021-05-18 DIAGNOSIS — Z30.2 ENCOUNTER FOR STERILIZATION: ICD-10-CM

## 2021-05-18 LAB
A1 MICROGLOB PLACENTAL VAG QL: NEGATIVE
GLUCOSE UR STRIP-MCNC: NEGATIVE MG/DL
PROT UR STRIP-MCNC: NEGATIVE MG/DL

## 2021-05-18 PROCEDURE — 0502F SUBSEQUENT PRENATAL CARE: CPT | Performed by: OBSTETRICS & GYNECOLOGY

## 2021-05-18 PROCEDURE — 84112 EVAL AMNIOTIC FLUID PROTEIN: CPT | Performed by: OBSTETRICS & GYNECOLOGY

## 2021-05-18 PROCEDURE — 87481 CANDIDA DNA AMP PROBE: CPT | Performed by: OBSTETRICS & GYNECOLOGY

## 2021-05-18 PROCEDURE — 87661 TRICHOMONAS VAGINALIS AMPLIF: CPT | Performed by: OBSTETRICS & GYNECOLOGY

## 2021-05-18 PROCEDURE — 87798 DETECT AGENT NOS DNA AMP: CPT | Performed by: OBSTETRICS & GYNECOLOGY

## 2021-05-18 PROCEDURE — 87563 M. GENITALIUM AMP PROBE: CPT | Performed by: OBSTETRICS & GYNECOLOGY

## 2021-05-18 PROCEDURE — 87512 GARDNER VAG DNA QUANT: CPT | Performed by: OBSTETRICS & GYNECOLOGY

## 2021-05-18 NOTE — PROGRESS NOTES
30 year old female  at 29.6 IUP presents for follow up. She reports some leakage of fluid. She has occasional stefan fan. No other obstetrical complaints.   PE   SSE: no pooling, negative ferninq, cervix closed   A/P  at 29.6 IUP   RTC In 2 weeks   AMnisure sent and negative   PTL Precautions   Will plan to scheduled c section for 2021

## 2021-05-24 LAB
LAB AP CASE REPORT: NORMAL
Lab: NORMAL
TRICHOMONAS VAGINALIS PCR: NOT DETECTED

## 2021-06-01 PROBLEM — Z98.891 PREVIOUS CESAREAN SECTION: Status: ACTIVE | Noted: 2021-06-01

## 2021-06-01 PROBLEM — Z34.93 PRENATAL CARE IN THIRD TRIMESTER: Status: ACTIVE | Noted: 2021-06-01

## 2021-06-01 PROBLEM — Z30.2 ENCOUNTER FOR STERILIZATION: Status: ACTIVE | Noted: 2021-06-01

## 2021-06-02 ENCOUNTER — ROUTINE PRENATAL (OUTPATIENT)
Dept: OBSTETRICS AND GYNECOLOGY | Facility: CLINIC | Age: 31
End: 2021-06-02

## 2021-06-02 VITALS — WEIGHT: 172 LBS | DIASTOLIC BLOOD PRESSURE: 74 MMHG | BODY MASS INDEX: 30.47 KG/M2 | SYSTOLIC BLOOD PRESSURE: 138 MMHG

## 2021-06-02 DIAGNOSIS — O26.893 VAGINAL DISCHARGE DURING PREGNANCY IN THIRD TRIMESTER: Primary | ICD-10-CM

## 2021-06-02 DIAGNOSIS — Z98.891 PREVIOUS CESAREAN SECTION: ICD-10-CM

## 2021-06-02 DIAGNOSIS — N89.8 VAGINAL DISCHARGE DURING PREGNANCY IN THIRD TRIMESTER: Primary | ICD-10-CM

## 2021-06-02 DIAGNOSIS — Z34.93 PRENATAL CARE IN THIRD TRIMESTER: ICD-10-CM

## 2021-06-02 LAB
GLUCOSE UR STRIP-MCNC: NEGATIVE MG/DL
PROT UR STRIP-MCNC: NEGATIVE MG/DL

## 2021-06-02 PROCEDURE — 87481 CANDIDA DNA AMP PROBE: CPT | Performed by: OBSTETRICS & GYNECOLOGY

## 2021-06-02 PROCEDURE — 0502F SUBSEQUENT PRENATAL CARE: CPT | Performed by: OBSTETRICS & GYNECOLOGY

## 2021-06-02 PROCEDURE — 87661 TRICHOMONAS VAGINALIS AMPLIF: CPT | Performed by: OBSTETRICS & GYNECOLOGY

## 2021-06-02 PROCEDURE — 87798 DETECT AGENT NOS DNA AMP: CPT | Performed by: OBSTETRICS & GYNECOLOGY

## 2021-06-02 PROCEDURE — 87512 GARDNER VAG DNA QUANT: CPT | Performed by: OBSTETRICS & GYNECOLOGY

## 2021-06-02 PROCEDURE — 87563 M. GENITALIUM AMP PROBE: CPT | Performed by: OBSTETRICS & GYNECOLOGY

## 2021-06-02 NOTE — PROGRESS NOTES
at 32 weeks IUP presents for follow up. She reports some vaginal discharge on Saturday as well as pain in her vaginal. She also reports decreased fetal movement.   PE   SSE: no pooling, no ferning, negative nitrazine   SVE: closed   A?P  at 32 weeks IUP   RTC in 2 weeks   Cultures sent with results to follow   Discussed with patient reasons she would need to go to labor and delivery including decreased fetal movement, vaginal bleeding, leakage of fluid or contractions.   BPP  today   Growth 16%ile

## 2021-06-07 LAB
LAB AP CASE REPORT: NORMAL
Lab: NORMAL
TRICHOMONAS VAGINALIS PCR: NOT DETECTED

## 2021-06-16 ENCOUNTER — TELEPHONE (OUTPATIENT)
Dept: OBSTETRICS AND GYNECOLOGY | Facility: CLINIC | Age: 31
End: 2021-06-16

## 2021-06-16 NOTE — TELEPHONE ENCOUNTER
PC stating baby has only moved once this am. Pt has not eaten or drank anything. Instructed to eat & drink soda with caffeine, lay on left side & call back in 1 hr. Pt called back & states baby has moved @ least 4 times since 10:45a. Told pt to continue to monitor his movements (kick counts) call office if decreased movement. Pt states she thinks she might have lost her mucous plug Mon. Informed pt that's ok. Pt v/u.

## 2021-06-18 ENCOUNTER — ROUTINE PRENATAL (OUTPATIENT)
Dept: OBSTETRICS AND GYNECOLOGY | Facility: CLINIC | Age: 31
End: 2021-06-18

## 2021-06-18 VITALS — SYSTOLIC BLOOD PRESSURE: 124 MMHG | DIASTOLIC BLOOD PRESSURE: 82 MMHG | BODY MASS INDEX: 31.18 KG/M2 | WEIGHT: 176 LBS

## 2021-06-18 DIAGNOSIS — Z34.93 PRENATAL CARE IN THIRD TRIMESTER: Primary | ICD-10-CM

## 2021-06-18 LAB
GLUCOSE UR STRIP-MCNC: NEGATIVE MG/DL
PROT UR STRIP-MCNC: ABNORMAL MG/DL

## 2021-06-18 PROCEDURE — 0502F SUBSEQUENT PRENATAL CARE: CPT | Performed by: OBSTETRICS & GYNECOLOGY

## 2021-06-18 NOTE — PROGRESS NOTES
at 34.2 IUP presents for follow up. Patient reports she has low back and vaginal pain with standing. She reports occasional contractions but not with a regular frequency. She also reports some nausea but for the most part is able to keep food down.   PE   SSE: closed   A/P  at 34.2 IUP   Growth greater than 10%ile today   BV panel negative at last visit   Encouraged patient to increase her water intake at this time. Only drinking 3 bottles a day.   Recommend belly band   Discussed with patient reasons to go to labor and delivery including bleeding, leakage, regular contractions, decreased fetal movement or continued nausea and vomiting.   All questions answered and patient verbalized understanding

## 2021-06-23 ENCOUNTER — ROUTINE PRENATAL (OUTPATIENT)
Dept: OBSTETRICS AND GYNECOLOGY | Facility: CLINIC | Age: 31
End: 2021-06-23

## 2021-06-23 VITALS — DIASTOLIC BLOOD PRESSURE: 82 MMHG | BODY MASS INDEX: 31 KG/M2 | WEIGHT: 175 LBS | SYSTOLIC BLOOD PRESSURE: 132 MMHG

## 2021-06-23 DIAGNOSIS — O36.8130 DECREASED FETAL MOVEMENTS IN THIRD TRIMESTER, SINGLE OR UNSPECIFIED FETUS: ICD-10-CM

## 2021-06-23 DIAGNOSIS — Z34.93 PRENATAL CARE IN THIRD TRIMESTER: Primary | ICD-10-CM

## 2021-06-23 LAB
GLUCOSE UR STRIP-MCNC: NEGATIVE MG/DL
PROT UR STRIP-MCNC: NEGATIVE MG/DL

## 2021-06-23 PROCEDURE — 0502F SUBSEQUENT PRENATAL CARE: CPT | Performed by: OBSTETRICS & GYNECOLOGY

## 2021-06-24 NOTE — PROGRESS NOTES
at 35 weeks IUP presents for follow up. She reports she has noticed decrease fetal movement and occasional contractions.   PE see above   A/P  at 35 weeks IUP   BPP 8/8 TY over 5   PTL Precautions discussed   Discussed with patient if she were to have leakage, bleeding, contractions or decreased fetal movement she needs to go to labor and delivery. Stressed importance   Recheck fluid in 1 week

## 2021-07-02 ENCOUNTER — ROUTINE PRENATAL (OUTPATIENT)
Dept: OBSTETRICS AND GYNECOLOGY | Facility: CLINIC | Age: 31
End: 2021-07-02

## 2021-07-02 VITALS — DIASTOLIC BLOOD PRESSURE: 82 MMHG | BODY MASS INDEX: 31.53 KG/M2 | SYSTOLIC BLOOD PRESSURE: 122 MMHG | WEIGHT: 178 LBS

## 2021-07-02 DIAGNOSIS — Z3A.36 36 WEEKS GESTATION OF PREGNANCY: Primary | ICD-10-CM

## 2021-07-02 LAB
GLUCOSE UR STRIP-MCNC: NEGATIVE MG/DL
PROT UR STRIP-MCNC: NEGATIVE MG/DL

## 2021-07-02 PROCEDURE — 0502F SUBSEQUENT PRENATAL CARE: CPT | Performed by: OBSTETRICS & GYNECOLOGY

## 2021-07-02 NOTE — PROGRESS NOTES
at 36.2 IUP presents for follow up prenatal care visit. No obstetrical complaints.   PE see above  A?P  at 36.2 IUP   RTC in 1 week   Cultures today   PTL precautions

## 2021-07-06 LAB
C TRACH RRNA SPEC QL NAA+PROBE: NEGATIVE
GP B STREP DNA SPEC QL NAA+PROBE: NEGATIVE
N GONORRHOEA RRNA SPEC QL NAA+PROBE: NEGATIVE

## 2021-07-09 ENCOUNTER — ROUTINE PRENATAL (OUTPATIENT)
Dept: OBSTETRICS AND GYNECOLOGY | Facility: CLINIC | Age: 31
End: 2021-07-09

## 2021-07-09 VITALS — SYSTOLIC BLOOD PRESSURE: 116 MMHG | WEIGHT: 178 LBS | DIASTOLIC BLOOD PRESSURE: 76 MMHG | BODY MASS INDEX: 31.53 KG/M2

## 2021-07-09 DIAGNOSIS — Z34.93 PRENATAL CARE IN THIRD TRIMESTER: Primary | ICD-10-CM

## 2021-07-09 LAB
GLUCOSE UR STRIP-MCNC: NEGATIVE MG/DL
PROT UR STRIP-MCNC: ABNORMAL MG/DL

## 2021-07-09 PROCEDURE — 0502F SUBSEQUENT PRENATAL CARE: CPT | Performed by: OBSTETRICS & GYNECOLOGY

## 2021-07-09 NOTE — PROGRESS NOTES
at 37.2 IUP presents for follow up prenatal care visit. No obstetrical complaints.   PE see above   A?P  at 37.2 IUP   RTC in 1 week   Labor precautions

## 2021-07-16 ENCOUNTER — ROUTINE PRENATAL (OUTPATIENT)
Dept: OBSTETRICS AND GYNECOLOGY | Facility: CLINIC | Age: 31
End: 2021-07-16

## 2021-07-16 VITALS — WEIGHT: 177 LBS | BODY MASS INDEX: 31.35 KG/M2 | SYSTOLIC BLOOD PRESSURE: 136 MMHG | DIASTOLIC BLOOD PRESSURE: 78 MMHG

## 2021-07-16 DIAGNOSIS — Z34.93 PRENATAL CARE IN THIRD TRIMESTER: Primary | ICD-10-CM

## 2021-07-16 LAB
GLUCOSE UR STRIP-MCNC: NEGATIVE MG/DL
PROT UR STRIP-MCNC: NEGATIVE MG/DL

## 2021-07-16 PROCEDURE — 0502F SUBSEQUENT PRENATAL CARE: CPT | Performed by: OBSTETRICS & GYNECOLOGY

## 2021-07-16 NOTE — PROGRESS NOTES
at 38.2 IUP presents for follow up. No obstetrical complaints.   PE see above   A/P  at 38.2 IUP   Scheduled repeat  section   Risk, benefits discussed. Risk including bleeding, infection as well as damage to underlying organs and viscera was discussed.

## 2021-07-19 ENCOUNTER — PREP FOR SURGERY (OUTPATIENT)
Dept: OTHER | Facility: HOSPITAL | Age: 31
End: 2021-07-19

## 2021-07-19 DIAGNOSIS — Z98.891 PREVIOUS CESAREAN SECTION: Primary | ICD-10-CM

## 2021-07-19 RX ORDER — MISOPROSTOL 200 UG/1
800 TABLET ORAL AS NEEDED
Status: CANCELLED | OUTPATIENT
Start: 2021-07-19

## 2021-07-19 RX ORDER — OXYTOCIN/0.9 % SODIUM CHLORIDE 30/500 ML
250 PLASTIC BAG, INJECTION (ML) INTRAVENOUS CONTINUOUS
Status: CANCELLED | OUTPATIENT
Start: 2021-07-19 | End: 2021-07-19

## 2021-07-19 RX ORDER — SODIUM CHLORIDE 0.9 % (FLUSH) 0.9 %
3 SYRINGE (ML) INJECTION EVERY 12 HOURS SCHEDULED
Status: CANCELLED | OUTPATIENT
Start: 2021-07-19

## 2021-07-19 RX ORDER — SODIUM CHLORIDE, SODIUM LACTATE, POTASSIUM CHLORIDE, CALCIUM CHLORIDE 600; 310; 30; 20 MG/100ML; MG/100ML; MG/100ML; MG/100ML
125 INJECTION, SOLUTION INTRAVENOUS CONTINUOUS
Status: CANCELLED | OUTPATIENT
Start: 2021-07-19

## 2021-07-19 RX ORDER — METHYLERGONOVINE MALEATE 0.2 MG/ML
200 INJECTION INTRAVENOUS ONCE AS NEEDED
Status: CANCELLED | OUTPATIENT
Start: 2021-07-19

## 2021-07-19 RX ORDER — LIDOCAINE HYDROCHLORIDE 10 MG/ML
5 INJECTION, SOLUTION EPIDURAL; INFILTRATION; INTRACAUDAL; PERINEURAL AS NEEDED
Status: CANCELLED | OUTPATIENT
Start: 2021-07-19

## 2021-07-19 RX ORDER — SODIUM CHLORIDE 0.9 % (FLUSH) 0.9 %
3-10 SYRINGE (ML) INJECTION AS NEEDED
Status: CANCELLED | OUTPATIENT
Start: 2021-07-19

## 2021-07-19 RX ORDER — OXYTOCIN/0.9 % SODIUM CHLORIDE 30/500 ML
125 PLASTIC BAG, INJECTION (ML) INTRAVENOUS CONTINUOUS PRN
Status: CANCELLED | OUTPATIENT
Start: 2021-07-19

## 2021-07-19 RX ORDER — OXYTOCIN/0.9 % SODIUM CHLORIDE 30/500 ML
999 PLASTIC BAG, INJECTION (ML) INTRAVENOUS ONCE
Status: CANCELLED | OUTPATIENT
Start: 2021-07-19 | End: 2021-07-19

## 2021-07-19 RX ORDER — BUPIVACAINE HCL/0.9 % NACL/PF 0.1 %
2 PLASTIC BAG, INJECTION (ML) EPIDURAL ONCE
Status: CANCELLED | OUTPATIENT
Start: 2021-07-19 | End: 2021-07-19

## 2021-07-19 RX ORDER — CARBOPROST TROMETHAMINE 250 UG/ML
250 INJECTION, SOLUTION INTRAMUSCULAR AS NEEDED
Status: CANCELLED | OUTPATIENT
Start: 2021-07-19

## 2021-07-19 NOTE — H&P
[facility]  Bailee Phoenix  : 1990  MRN: [id number]  CSN: [id number]    History and Physical    Subjective   Bailee Phoenix is a 31 y.o. year old  with an Estimated Date of Delivery: 21 scheduled on 2021 for  delivery due to previous  section declines .  She is planning for sterilization at the time of the .    Prenatal care has been with Dr. Sondra Clinton.  It has been complicated by MCI.    OB History    Para Term  AB Living   3 1 1 0 1 1   SAB TAB Ectopic Molar Multiple Live Births   1 0 0 0 0 1      # Outcome Date GA Lbr Oseas/2nd Weight Sex Delivery Anes PTL Lv   3 Current            2 Term 03/15/18 37w0d  2650 g (5 lb 13.5 oz) F CS-LTranv  Y TANNER      Birth Comments: AGA      Name: PEGGY PHOENIX      Apgar1: 8  Apgar5: 9   1 SAB 06/01/15 8w0d    SAB        Past Medical History:   Diagnosis Date   • Kidney stone    • Migraine      Past Surgical History:   Procedure Laterality Date   • APPENDECTOMY     •  SECTION N/A 3/15/2018    Procedure:  SECTION PRIMARY;  Surgeon: Helen Cheng DO;  Location: [facility] LABOR DELIVERY;  Service: Obstetrics/Gynecology   • CHOLECYSTECTOMY     • DILATATION AND CURETTAGE     • KNEE ACL RECONSTRUCTION     • LAPAROSCOPIC CHOLECYSTECTOMY         Current Outpatient Medications:   •  metoclopramide (Reglan) 10 MG tablet, Take 1 tablet by mouth 4 (Four) Times a Day., Disp: 120 tablet, Rfl: 1  •  Prenat w/o A-FeCbGl-DSS-FA-DHA (PNV OB+DHA) 27-1 & 250 MG misc, Take 1 tablet by mouth Daily., Disp: 30 each, Rfl: 12  Family History   Problem Relation Age of Onset   • Endometriosis Mother    • No Known Problems Brother    • Endometriosis Sister    • No Known Problems Brother    • No Known Problems Brother    • Breast cancer Neg Hx    • Ovarian cancer Neg Hx    • Uterine cancer Neg Hx    • Colon cancer Neg Hx    • Melanoma Neg Hx    • Prostate cancer Neg Hx        No Known Allergies  Social  History    Tobacco Use      Smoking status: Never Smoker      Smokeless tobacco: Never Used    Review of Systems   Genitourinary: Negative for pelvic pain, vaginal bleeding and vaginal discharge.         Objective   LMP 10/29/2020   General: well developed; well nourished  no acute distress   Heart: Not performed.   Lungs: breathing is unlabored   Abdomen: soft, non-tender; no masses  no umbilical or inguinal hernias are present  no hepato-splenomegaly     Cervix:   Not checked    Prenatal Labs  Lab Results   Component Value Date    HGB 10.4 (L) 2021    HEPBSAG Negative 2020    ABORH B Rh Positive 08/10/2015    ABO B 2020    RH Positive 2020    ABSCRN Negative 2020    [id number] Non Reactive 2020    URINECX Final report 2020       Recent Labs  Lab Results   Component Value Date    HGB 10.4 (L) 2021    HCT 34.5 2020    WBC 8.35 2020     2020           Assessment   1. IUP with an Estimated Date of Delivery: 21  2. Planned  section on 2021 for previous  section declines .     Plan   1. Repeat  with sterilization    Ruthy Clinton, DO  2021

## 2021-07-19 NOTE — H&P (VIEW-ONLY)
[facility]  Bailee Phoenix  : 1990  MRN: [id number]  CSN: [id number]    History and Physical    Subjective   Bailee Phoenix is a 31 y.o. year old  with an Estimated Date of Delivery: 21 scheduled on 2021 for  delivery due to previous  section declines .  She is planning for sterilization at the time of the .    Prenatal care has been with Dr. Sondra Clinton.  It has been complicated by MCI.    OB History    Para Term  AB Living   3 1 1 0 1 1   SAB TAB Ectopic Molar Multiple Live Births   1 0 0 0 0 1      # Outcome Date GA Lbr Oseas/2nd Weight Sex Delivery Anes PTL Lv   3 Current            2 Term 03/15/18 37w0d  2650 g (5 lb 13.5 oz) F CS-LTranv  Y TANNER      Birth Comments: AGA      Name: PEGGY PHOENIX      Apgar1: 8  Apgar5: 9   1 SAB 06/01/15 8w0d    SAB        Past Medical History:   Diagnosis Date   • Kidney stone    • Migraine      Past Surgical History:   Procedure Laterality Date   • APPENDECTOMY     •  SECTION N/A 3/15/2018    Procedure:  SECTION PRIMARY;  Surgeon: Helen Cheng DO;  Location: [facility] LABOR DELIVERY;  Service: Obstetrics/Gynecology   • CHOLECYSTECTOMY     • DILATATION AND CURETTAGE     • KNEE ACL RECONSTRUCTION     • LAPAROSCOPIC CHOLECYSTECTOMY         Current Outpatient Medications:   •  metoclopramide (Reglan) 10 MG tablet, Take 1 tablet by mouth 4 (Four) Times a Day., Disp: 120 tablet, Rfl: 1  •  Prenat w/o A-FeCbGl-DSS-FA-DHA (PNV OB+DHA) 27-1 & 250 MG misc, Take 1 tablet by mouth Daily., Disp: 30 each, Rfl: 12  Family History   Problem Relation Age of Onset   • Endometriosis Mother    • No Known Problems Brother    • Endometriosis Sister    • No Known Problems Brother    • No Known Problems Brother    • Breast cancer Neg Hx    • Ovarian cancer Neg Hx    • Uterine cancer Neg Hx    • Colon cancer Neg Hx    • Melanoma Neg Hx    • Prostate cancer Neg Hx        No Known Allergies  Social  History    Tobacco Use      Smoking status: Never Smoker      Smokeless tobacco: Never Used    Review of Systems   Genitourinary: Negative for pelvic pain, vaginal bleeding and vaginal discharge.         Objective   LMP 10/29/2020   General: well developed; well nourished  no acute distress   Heart: Not performed.   Lungs: breathing is unlabored   Abdomen: soft, non-tender; no masses  no umbilical or inguinal hernias are present  no hepato-splenomegaly     Cervix:   Not checked    Prenatal Labs  Lab Results   Component Value Date    HGB 10.4 (L) 2021    HEPBSAG Negative 2020    ABORH B Rh Positive 08/10/2015    ABO B 2020    RH Positive 2020    ABSCRN Negative 2020    [id number] Non Reactive 2020    URINECX Final report 2020       Recent Labs  Lab Results   Component Value Date    HGB 10.4 (L) 2021    HCT 34.5 2020    WBC 8.35 2020     2020           Assessment   1. IUP with an Estimated Date of Delivery: 21  2. Planned  section on 2021 for previous  section declines .     Plan   1. Repeat  with sterilization    Ruthy Clinton, DO  2021

## 2021-07-22 ENCOUNTER — ANESTHESIA EVENT (OUTPATIENT)
Dept: LABOR AND DELIVERY | Facility: HOSPITAL | Age: 31
End: 2021-07-22

## 2021-07-22 ENCOUNTER — ANESTHESIA (OUTPATIENT)
Dept: LABOR AND DELIVERY | Facility: HOSPITAL | Age: 31
End: 2021-07-22

## 2021-07-22 ENCOUNTER — HOSPITAL ENCOUNTER (INPATIENT)
Facility: HOSPITAL | Age: 31
LOS: 2 days | Discharge: HOME | End: 2021-07-24
Attending: OBSTETRICS & GYNECOLOGY | Admitting: OBSTETRICS & GYNECOLOGY

## 2021-07-22 DIAGNOSIS — Z30.2 ENCOUNTER FOR STERILIZATION: ICD-10-CM

## 2021-07-22 DIAGNOSIS — N97.9 FEMALE STERILITY: ICD-10-CM

## 2021-07-22 DIAGNOSIS — Z98.891 PREVIOUS CESAREAN SECTION: ICD-10-CM

## 2021-07-22 DIAGNOSIS — Z34.93 PRENATAL CARE IN THIRD TRIMESTER: ICD-10-CM

## 2021-07-22 LAB
ABO GROUP BLD: NORMAL
BLD GP AB SCN SERPL QL: NEGATIVE
DEPRECATED RDW RBC AUTO: 39 FL (ref 37–54)
ERYTHROCYTE [DISTWIDTH] IN BLOOD BY AUTOMATED COUNT: 13.5 % (ref 12.3–15.4)
HCT VFR BLD AUTO: 33.2 % (ref 34–46.6)
HGB BLD-MCNC: 11.1 G/DL (ref 12–15.9)
MCH RBC QN AUTO: 27.1 PG (ref 26.6–33)
MCHC RBC AUTO-ENTMCNC: 33.4 G/DL (ref 31.5–35.7)
MCV RBC AUTO: 81 FL (ref 79–97)
PLATELET # BLD AUTO: 268 10*3/MM3 (ref 140–450)
PMV BLD AUTO: 11.2 FL (ref 6–12)
RBC # BLD AUTO: 4.1 10*6/MM3 (ref 3.77–5.28)
RH BLD: POSITIVE
T&S EXPIRATION DATE: NORMAL
WBC # BLD AUTO: 13.29 10*3/MM3 (ref 3.4–10.8)

## 2021-07-22 PROCEDURE — 25010000002 ONDANSETRON PER 1 MG: Performed by: NURSE ANESTHETIST, CERTIFIED REGISTERED

## 2021-07-22 PROCEDURE — 25010000002 KETOROLAC TROMETHAMINE PER 15 MG: Performed by: NURSE ANESTHETIST, CERTIFIED REGISTERED

## 2021-07-22 PROCEDURE — 25010000002 HYDROMORPHONE 1 MG/ML SOLUTION: Performed by: NURSE ANESTHETIST, CERTIFIED REGISTERED

## 2021-07-22 PROCEDURE — 58700 REMOVAL OF FALLOPIAN TUBE: CPT | Performed by: OBSTETRICS & GYNECOLOGY

## 2021-07-22 PROCEDURE — 86850 RBC ANTIBODY SCREEN: CPT | Performed by: OBSTETRICS & GYNECOLOGY

## 2021-07-22 PROCEDURE — 88307 TISSUE EXAM BY PATHOLOGIST: CPT | Performed by: OBSTETRICS & GYNECOLOGY

## 2021-07-22 PROCEDURE — 94799 UNLISTED PULMONARY SVC/PX: CPT

## 2021-07-22 PROCEDURE — 59510 CESAREAN DELIVERY: CPT | Performed by: OBSTETRICS & GYNECOLOGY

## 2021-07-22 PROCEDURE — 88305 TISSUE EXAM BY PATHOLOGIST: CPT | Performed by: OBSTETRICS & GYNECOLOGY

## 2021-07-22 PROCEDURE — 25010000002 KETOROLAC TROMETHAMINE PER 15 MG: Performed by: OBSTETRICS & GYNECOLOGY

## 2021-07-22 PROCEDURE — 86900 BLOOD TYPING SEROLOGIC ABO: CPT | Performed by: OBSTETRICS & GYNECOLOGY

## 2021-07-22 PROCEDURE — 86901 BLOOD TYPING SEROLOGIC RH(D): CPT | Performed by: OBSTETRICS & GYNECOLOGY

## 2021-07-22 PROCEDURE — 25010000002 CEFAZOLIN PER 500 MG: Performed by: OBSTETRICS & GYNECOLOGY

## 2021-07-22 PROCEDURE — 51702 INSERT TEMP BLADDER CATH: CPT

## 2021-07-22 PROCEDURE — 85027 COMPLETE CBC AUTOMATED: CPT | Performed by: OBSTETRICS & GYNECOLOGY

## 2021-07-22 PROCEDURE — 0UB70ZZ EXCISION OF BILATERAL FALLOPIAN TUBES, OPEN APPROACH: ICD-10-PCS | Performed by: OBSTETRICS & GYNECOLOGY

## 2021-07-22 DEVICE — ARISTA AH ABSORBABLE HEMOSTATIC PARTICLES
Type: IMPLANTABLE DEVICE | Status: FUNCTIONAL
Brand: ARISTA™ AH

## 2021-07-22 RX ORDER — METHYLERGONOVINE MALEATE 0.2 MG/ML
200 INJECTION INTRAVENOUS ONCE AS NEEDED
Status: DISCONTINUED | OUTPATIENT
Start: 2021-07-22 | End: 2021-07-22 | Stop reason: HOSPADM

## 2021-07-22 RX ORDER — PRENATAL VIT/IRON FUM/FOLIC AC 27MG-0.8MG
1 TABLET ORAL DAILY
Status: DISCONTINUED | OUTPATIENT
Start: 2021-07-22 | End: 2021-07-24 | Stop reason: HOSPADM

## 2021-07-22 RX ORDER — OXYTOCIN/0.9 % SODIUM CHLORIDE 30/500 ML
999 PLASTIC BAG, INJECTION (ML) INTRAVENOUS ONCE
Status: DISCONTINUED | OUTPATIENT
Start: 2021-07-22 | End: 2021-07-24 | Stop reason: HOSPADM

## 2021-07-22 RX ORDER — OXYTOCIN 10 [USP'U]/ML
INJECTION, SOLUTION INTRAMUSCULAR; INTRAVENOUS AS NEEDED
Status: DISCONTINUED | OUTPATIENT
Start: 2021-07-22 | End: 2021-07-22 | Stop reason: SURG

## 2021-07-22 RX ORDER — SODIUM CHLORIDE, SODIUM LACTATE, POTASSIUM CHLORIDE, CALCIUM CHLORIDE 600; 310; 30; 20 MG/100ML; MG/100ML; MG/100ML; MG/100ML
125 INJECTION, SOLUTION INTRAVENOUS CONTINUOUS
Status: DISCONTINUED | OUTPATIENT
Start: 2021-07-22 | End: 2021-07-22

## 2021-07-22 RX ORDER — OXYTOCIN/0.9 % SODIUM CHLORIDE 30/500 ML
250 PLASTIC BAG, INJECTION (ML) INTRAVENOUS CONTINUOUS
Status: DISCONTINUED | OUTPATIENT
Start: 2021-07-22 | End: 2021-07-22 | Stop reason: SDUPTHER

## 2021-07-22 RX ORDER — FAMOTIDINE 10 MG/ML
20 INJECTION, SOLUTION INTRAVENOUS ONCE AS NEEDED
Status: COMPLETED | OUTPATIENT
Start: 2021-07-22 | End: 2021-07-22

## 2021-07-22 RX ORDER — OXYTOCIN/0.9 % SODIUM CHLORIDE 30/500 ML
999 PLASTIC BAG, INJECTION (ML) INTRAVENOUS ONCE
Status: DISCONTINUED | OUTPATIENT
Start: 2021-07-22 | End: 2021-07-22 | Stop reason: SDUPTHER

## 2021-07-22 RX ORDER — OXYTOCIN/0.9 % SODIUM CHLORIDE 30/500 ML
999 PLASTIC BAG, INJECTION (ML) INTRAVENOUS ONCE
Status: DISCONTINUED | OUTPATIENT
Start: 2021-07-22 | End: 2021-07-22

## 2021-07-22 RX ORDER — DIPHENHYDRAMINE HCL 25 MG
25 CAPSULE ORAL EVERY 4 HOURS PRN
Status: DISCONTINUED | OUTPATIENT
Start: 2021-07-22 | End: 2021-07-24 | Stop reason: HOSPADM

## 2021-07-22 RX ORDER — OXYTOCIN/0.9 % SODIUM CHLORIDE 30/500 ML
250 PLASTIC BAG, INJECTION (ML) INTRAVENOUS CONTINUOUS
Status: DISCONTINUED | OUTPATIENT
Start: 2021-07-22 | End: 2021-07-22

## 2021-07-22 RX ORDER — MISOPROSTOL 200 UG/1
800 TABLET ORAL AS NEEDED
Status: DISCONTINUED | OUTPATIENT
Start: 2021-07-22 | End: 2021-07-22 | Stop reason: HOSPADM

## 2021-07-22 RX ORDER — OXYCODONE AND ACETAMINOPHEN 7.5; 325 MG/1; MG/1
2 TABLET ORAL EVERY 4 HOURS PRN
Status: DISPENSED | OUTPATIENT
Start: 2021-07-22 | End: 2021-07-23

## 2021-07-22 RX ORDER — TRISODIUM CITRATE DIHYDRATE AND CITRIC ACID MONOHYDRATE 500; 334 MG/5ML; MG/5ML
15 SOLUTION ORAL ONCE
Status: COMPLETED | OUTPATIENT
Start: 2021-07-22 | End: 2021-07-22

## 2021-07-22 RX ORDER — ONDANSETRON 4 MG/1
4 TABLET, FILM COATED ORAL EVERY 8 HOURS PRN
Status: DISCONTINUED | OUTPATIENT
Start: 2021-07-22 | End: 2021-07-24 | Stop reason: HOSPADM

## 2021-07-22 RX ORDER — ONDANSETRON 2 MG/ML
INJECTION INTRAMUSCULAR; INTRAVENOUS AS NEEDED
Status: DISCONTINUED | OUTPATIENT
Start: 2021-07-22 | End: 2021-07-22 | Stop reason: SURG

## 2021-07-22 RX ORDER — PROMETHAZINE HYDROCHLORIDE 25 MG/1
12.5 TABLET ORAL EVERY 4 HOURS PRN
Status: DISCONTINUED | OUTPATIENT
Start: 2021-07-22 | End: 2021-07-24 | Stop reason: HOSPADM

## 2021-07-22 RX ORDER — SIMETHICONE 80 MG
80 TABLET,CHEWABLE ORAL 4 TIMES DAILY PRN
Status: DISCONTINUED | OUTPATIENT
Start: 2021-07-22 | End: 2021-07-24 | Stop reason: HOSPADM

## 2021-07-22 RX ORDER — DOCUSATE SODIUM 100 MG/1
100 CAPSULE, LIQUID FILLED ORAL 2 TIMES DAILY PRN
Status: DISCONTINUED | OUTPATIENT
Start: 2021-07-22 | End: 2021-07-24 | Stop reason: HOSPADM

## 2021-07-22 RX ORDER — OXYCODONE AND ACETAMINOPHEN 7.5; 325 MG/1; MG/1
1 TABLET ORAL EVERY 4 HOURS PRN
Status: ACTIVE | OUTPATIENT
Start: 2021-07-22 | End: 2021-07-23

## 2021-07-22 RX ORDER — SODIUM CHLORIDE 0.9 % (FLUSH) 0.9 %
3-10 SYRINGE (ML) INJECTION AS NEEDED
Status: DISCONTINUED | OUTPATIENT
Start: 2021-07-22 | End: 2021-07-22 | Stop reason: HOSPADM

## 2021-07-22 RX ORDER — SODIUM CHLORIDE 0.9 % (FLUSH) 0.9 %
3 SYRINGE (ML) INJECTION EVERY 12 HOURS SCHEDULED
Status: DISCONTINUED | OUTPATIENT
Start: 2021-07-22 | End: 2021-07-22 | Stop reason: HOSPADM

## 2021-07-22 RX ORDER — MAGNESIUM HYDROXIDE 1200 MG/15ML
LIQUID ORAL AS NEEDED
Status: DISCONTINUED | OUTPATIENT
Start: 2021-07-22 | End: 2021-07-24 | Stop reason: HOSPADM

## 2021-07-22 RX ORDER — KETOROLAC TROMETHAMINE 30 MG/ML
30 INJECTION, SOLUTION INTRAMUSCULAR; INTRAVENOUS EVERY 6 HOURS PRN
Status: COMPLETED | OUTPATIENT
Start: 2021-07-22 | End: 2021-07-23

## 2021-07-22 RX ORDER — BUPIVACAINE HCL/0.9 % NACL/PF 0.1 %
2 PLASTIC BAG, INJECTION (ML) EPIDURAL ONCE
Status: COMPLETED | OUTPATIENT
Start: 2021-07-22 | End: 2021-07-22

## 2021-07-22 RX ORDER — OXYTOCIN/0.9 % SODIUM CHLORIDE 30/500 ML
125 PLASTIC BAG, INJECTION (ML) INTRAVENOUS CONTINUOUS PRN
Status: DISCONTINUED | OUTPATIENT
Start: 2021-07-22 | End: 2021-07-24 | Stop reason: HOSPADM

## 2021-07-22 RX ORDER — KETOROLAC TROMETHAMINE 30 MG/ML
INJECTION, SOLUTION INTRAMUSCULAR; INTRAVENOUS AS NEEDED
Status: DISCONTINUED | OUTPATIENT
Start: 2021-07-22 | End: 2021-07-22 | Stop reason: SURG

## 2021-07-22 RX ORDER — OXYTOCIN/0.9 % SODIUM CHLORIDE 30/500 ML
250 PLASTIC BAG, INJECTION (ML) INTRAVENOUS CONTINUOUS
Status: ACTIVE | OUTPATIENT
Start: 2021-07-22 | End: 2021-07-22

## 2021-07-22 RX ORDER — BUPIVACAINE HYDROCHLORIDE 7.5 MG/ML
INJECTION, SOLUTION EPIDURAL; RETROBULBAR
Status: COMPLETED | OUTPATIENT
Start: 2021-07-22 | End: 2021-07-22

## 2021-07-22 RX ORDER — SODIUM CHLORIDE, SODIUM LACTATE, POTASSIUM CHLORIDE, CALCIUM CHLORIDE 600; 310; 30; 20 MG/100ML; MG/100ML; MG/100ML; MG/100ML
125 INJECTION, SOLUTION INTRAVENOUS CONTINUOUS
Status: DISCONTINUED | OUTPATIENT
Start: 2021-07-22 | End: 2021-07-24 | Stop reason: HOSPADM

## 2021-07-22 RX ORDER — CARBOPROST TROMETHAMINE 250 UG/ML
250 INJECTION, SOLUTION INTRAMUSCULAR ONCE AS NEEDED
Status: DISCONTINUED | OUTPATIENT
Start: 2021-07-22 | End: 2021-07-24 | Stop reason: HOSPADM

## 2021-07-22 RX ORDER — NALOXONE HCL 0.4 MG/ML
0.4 VIAL (ML) INJECTION
Status: ACTIVE | OUTPATIENT
Start: 2021-07-22 | End: 2021-07-23

## 2021-07-22 RX ORDER — OXYTOCIN/0.9 % SODIUM CHLORIDE 30/500 ML
125 PLASTIC BAG, INJECTION (ML) INTRAVENOUS CONTINUOUS PRN
Status: COMPLETED | OUTPATIENT
Start: 2021-07-22 | End: 2021-07-22

## 2021-07-22 RX ORDER — OXYTOCIN/0.9 % SODIUM CHLORIDE 30/500 ML
125 PLASTIC BAG, INJECTION (ML) INTRAVENOUS CONTINUOUS PRN
Status: DISCONTINUED | OUTPATIENT
Start: 2021-07-22 | End: 2021-07-22 | Stop reason: SDUPTHER

## 2021-07-22 RX ORDER — LIDOCAINE HYDROCHLORIDE 10 MG/ML
5 INJECTION, SOLUTION EPIDURAL; INFILTRATION; INTRACAUDAL; PERINEURAL AS NEEDED
Status: DISCONTINUED | OUTPATIENT
Start: 2021-07-22 | End: 2021-07-22 | Stop reason: HOSPADM

## 2021-07-22 RX ORDER — ONDANSETRON 2 MG/ML
4 INJECTION INTRAMUSCULAR; INTRAVENOUS EVERY 6 HOURS PRN
Status: DISPENSED | OUTPATIENT
Start: 2021-07-22 | End: 2021-07-23

## 2021-07-22 RX ORDER — OXYCODONE AND ACETAMINOPHEN 10; 325 MG/1; MG/1
1 TABLET ORAL EVERY 4 HOURS PRN
Status: DISCONTINUED | OUTPATIENT
Start: 2021-07-23 | End: 2021-07-24 | Stop reason: HOSPADM

## 2021-07-22 RX ORDER — CARBOPROST TROMETHAMINE 250 UG/ML
250 INJECTION, SOLUTION INTRAMUSCULAR AS NEEDED
Status: DISCONTINUED | OUTPATIENT
Start: 2021-07-22 | End: 2021-07-22 | Stop reason: HOSPADM

## 2021-07-22 RX ORDER — MISOPROSTOL 200 UG/1
600 TABLET ORAL ONCE AS NEEDED
Status: DISCONTINUED | OUTPATIENT
Start: 2021-07-22 | End: 2021-07-24 | Stop reason: HOSPADM

## 2021-07-22 RX ORDER — OXYCODONE HYDROCHLORIDE AND ACETAMINOPHEN 5; 325 MG/1; MG/1
1 TABLET ORAL EVERY 4 HOURS PRN
Status: DISCONTINUED | OUTPATIENT
Start: 2021-07-23 | End: 2021-07-24 | Stop reason: HOSPADM

## 2021-07-22 RX ORDER — BUTORPHANOL TARTRATE 1 MG/ML
0.5 INJECTION, SOLUTION INTRAMUSCULAR; INTRAVENOUS EVERY 6 HOURS PRN
Status: ACTIVE | OUTPATIENT
Start: 2021-07-22 | End: 2021-07-23

## 2021-07-22 RX ORDER — IBUPROFEN 800 MG/1
800 TABLET ORAL EVERY 8 HOURS PRN
Status: DISCONTINUED | OUTPATIENT
Start: 2021-07-22 | End: 2021-07-24 | Stop reason: HOSPADM

## 2021-07-22 RX ADMIN — OXYTOCIN-SODIUM CHLORIDE 0.9% IV SOLN 30 UNIT/500ML 125 ML/HR: 30-0.9/5 SOLUTION at 15:56

## 2021-07-22 RX ADMIN — BUPIVACAINE HYDROCHLORIDE 1.5 ML: 7.5 INJECTION, SOLUTION EPIDURAL; RETROBULBAR at 13:49

## 2021-07-22 RX ADMIN — HYDROMORPHONE HYDROCHLORIDE 400 MCG: 1 INJECTION, SOLUTION INTRAMUSCULAR; INTRAVENOUS; SUBCUTANEOUS at 13:55

## 2021-07-22 RX ADMIN — ONDANSETRON 4 MG: 2 INJECTION INTRAMUSCULAR; INTRAVENOUS at 13:45

## 2021-07-22 RX ADMIN — HYDROMORPHONE HYDROCHLORIDE 200 MCG: 1 INJECTION, SOLUTION INTRAMUSCULAR; INTRAVENOUS; SUBCUTANEOUS at 14:20

## 2021-07-22 RX ADMIN — HYDROMORPHONE HYDROCHLORIDE 300 MCG: 1 INJECTION, SOLUTION INTRAMUSCULAR; INTRAVENOUS; SUBCUTANEOUS at 14:04

## 2021-07-22 RX ADMIN — ONDANSETRON 4 MG: 2 INJECTION INTRAMUSCULAR; INTRAVENOUS at 22:41

## 2021-07-22 RX ADMIN — SODIUM CITRATE AND CITRIC ACID MONOHYDRATE 15 ML: 500; 334 SOLUTION ORAL at 13:03

## 2021-07-22 RX ADMIN — FAMOTIDINE 20 MG: 10 INJECTION, SOLUTION INTRAVENOUS at 13:03

## 2021-07-22 RX ADMIN — KETOROLAC TROMETHAMINE 30 MG: 30 INJECTION, SOLUTION INTRAMUSCULAR at 20:36

## 2021-07-22 RX ADMIN — HYDROMORPHONE HYDROCHLORIDE 100 MCG: 1 INJECTION, SOLUTION INTRAMUSCULAR; INTRAVENOUS; SUBCUTANEOUS at 13:37

## 2021-07-22 RX ADMIN — KETOROLAC TROMETHAMINE 30 MG: 30 INJECTION, SOLUTION INTRAMUSCULAR; INTRAVENOUS at 14:39

## 2021-07-22 RX ADMIN — ONDANSETRON 4 MG: 2 INJECTION INTRAMUSCULAR; INTRAVENOUS at 16:41

## 2021-07-22 RX ADMIN — CEFAZOLIN SODIUM 2 G: 10 INJECTION, POWDER, FOR SOLUTION INTRAVENOUS at 13:03

## 2021-07-22 RX ADMIN — SODIUM CHLORIDE, POTASSIUM CHLORIDE, SODIUM LACTATE AND CALCIUM CHLORIDE: 600; 310; 30; 20 INJECTION, SOLUTION INTRAVENOUS at 13:51

## 2021-07-22 RX ADMIN — OXYTOCIN 30 UNITS: 10 INJECTION, SOLUTION INTRAMUSCULAR; INTRAVENOUS at 13:52

## 2021-07-22 RX ADMIN — SODIUM CHLORIDE, POTASSIUM CHLORIDE, SODIUM LACTATE AND CALCIUM CHLORIDE 125 ML/HR: 600; 310; 30; 20 INJECTION, SOLUTION INTRAVENOUS at 19:20

## 2021-07-22 RX ADMIN — SODIUM CHLORIDE, POTASSIUM CHLORIDE, SODIUM LACTATE AND CALCIUM CHLORIDE 125 ML/HR: 600; 310; 30; 20 INJECTION, SOLUTION INTRAVENOUS at 13:03

## 2021-07-22 NOTE — ANESTHESIA PREPROCEDURE EVALUATION
Anesthesia Evaluation     NPO Solid Status: > 8 hours  NPO Liquid Status: > 8 hours           Airway   Mallampati: I  TM distance: >3 FB  Neck ROM: full  Dental - normal exam     Pulmonary - negative pulmonary ROS and normal exam   Cardiovascular - negative cardio ROS and normal exam  Exercise tolerance: excellent (>7 METS)        Neuro/Psych  (+) headaches,     GI/Hepatic/Renal/Endo    (+)   renal disease stones,     Musculoskeletal (-) negative ROS    Abdominal   (+) obese,     Abdomen: soft.   Substance History - negative use     OB/GYN    (+) Pregnant,         Other - negative ROS                       Anesthesia Plan    ASA 2     spinal       Anesthetic plan, all risks, benefits, and alternatives have been provided, discussed and informed consent has been obtained with: patient.  Use of blood products discussed with patient  Consented to blood products.

## 2021-07-22 NOTE — OP NOTE
[facility]  Bailee Phoenix  : 1990  MRN: [id number]  [facility]: [id number]  Date: 2021    Operative Note     SECTION REPEAT WITH TUBAL      Pre-op Diagnosis:  Prenatal care in third trimester [Z34.93]  Previous  section [Z98.891]  Encounter for sterilization [Z30.2]   Post-op Diagnosis:  Post-Op Diagnosis Codes:     * Prenatal care in third trimester [Z34.93]     * Previous  section [Z98.891]     * Encounter for sterilization [Z30.2]   Procedure: Procedure(s):   SECTION REPEAT WITH Bilateral salpingectomy   Surgeon: Surgeon(s):  Ruthy Clinton DO       Anesthesia: Spinal by ABHISHEK Holly CRNA     Estimated Blood Loss: 750   mls   Fluids: 1300   mls   UOP: 150   mls   Drains: Strong catheter   ABx: Ancef     Specimens:  Placenta and cord and bilateral fallopian tubes   Findings: Normal uterus tubes and ovaries, thin lower uterine segment    Complications: none      INDICATION: Bailee Phoenix is a 31 y.o. female 3 para 1 at 39+ weeks scheduled for repeat  section.  Risk benefits alternatives were discussed with patient.  Risk including bleeding, infection as well as damage to underlying organs and viscera were discussed.  Patient also requested permanent sterilization with bilateral salpingectomy.  Counseled patient extensively on different management options.  Patient requested bilateral complete salpingectomy.  All questions answered patient verbalized understanding of plan.     PROCEDURE: After informed consent was obtained, the patient was taken to the operating room where spinal anesthesia was administered. Time out procedure was completed and perioperative antibiotics were administered. She was placed in the supine position with leftward tilt and her abdomen was prepped and draped in normal sterile fashion after a Strong catheter was placed by nursing staff.   After confirming adequate anesthesia, a Pfannenstiel incision was made with a scalpel 2  fingerbreadths above the pubic symphysis.  This was carried down sharply to the underlying fascia which was incised in the midline.  The fascial incision was extended laterally with Daly scissors.  The fascial edges were then elevated and the underlying rectus muscles dissected off with sharp technique.  The rectus muscles were  in the midline and the underlying peritoneum identified and entered bluntly.  The peritoneal incision was then extended and the Cirilo O retractor inserted.  The vesicouterine peritoneum was sharply incised with Metzenbaum scissors to create a bladder flap.  A low transverse uterine incision was made with a clean scalpel.  The uterine incision was bluntly extended and amniotomy was performed returning clear fluid.  The head of the infant was delivered through the incision without difficulty and the remainder of the infant was delivered with fundal pressure.  The infant was vigorous on the field, the cord was clamped and cut, and the infant handed off to waiting nursery nurse.  Cord blood and gases were obtained and the placenta then manually removed.  The uterus was exteriorized and cleared of clot and debris with a moist laparotomy sponge.  The uterine incision was closed with a running locked suture of 0 Monocryl.  A second imbricating layer of 0 Monocryl was placed for reinforcement.  The uterine incision was hemostatic.  At this point attention was then turned to the left fallopian tube the left fallopian tube was removed and sent to our ED using the harmonic device.  Excellent hemostasis was noted.  The right tube was removed in a similar fashion.  At this point new left side of the uterus near the insertion of the fallopian tube was noted to be bleeding.  Single figure-of-eight 2-0 Vicryl suture was placed and excellent hemostasis was noted.  The uterus was gently returned to the abdomen.  The Cirilo O retractor was removed.  The uterine incision was reinspected with  hemostasis noted.  Alecia was applied. The parietal peritoneum was then closed with a running suture of 2-0 Vicryl and the rectus muscles were reapproximated.  The subfascial spaces and rectus muscles were inspected with hemostasis noted.  The fascia was then closed with a running suture of 0 Vicryl.  The subcutaneous tissues were irrigated and made hemostatic with Bovie cautery.  The subcutaneous tissues were reapproximated with interrupted sutures of 2-0 plain gut.  The skin was closed with a subcuticular stitch of 4-0 Vicryl and reinforced with Steri-Strips.  A sterile pressure dressing was then applied.    After expressing the uterus the patient was transitioned to the stretcher and taken to the recovery room in stable condition.  She tolerated the procedure well without complications.  All sponge, needle, and instrument counts were correct ×3 per the OR staff.    Ruthy Clinton DO   7/22/2021  14:51 CDT

## 2021-07-22 NOTE — INTERVAL H&P NOTE
H&P reviewed. The patient was examined and there are no changes to the H&P.  Patient risk benefits and alternatives to complete salpingectomy.  Patient verbalized understanding and elected to proceed.

## 2021-07-22 NOTE — PLAN OF CARE
Goal Outcome Evaluation:  Plan of Care Reviewed With: patient, spouse        Progress: improving  Outcome Summary: VSS: FF/ML/U1 with scant lochia, pt with pressure dressing over incision clean dry and intact, IV infusing, nothing for pain given, zofran given for nausea- pt refused phenergen, had the TDAP vaccine,

## 2021-07-22 NOTE — ANESTHESIA PROCEDURE NOTES
Spinal Block      Patient reassessed immediately prior to procedure    Patient location during procedure: OB  Start Time: 7/22/2021 1:25 PM  Stop Time: 7/22/2021 1:38 PM  Indication:post-op pain management and procedure for pain  Performed By  CRNA: Jeremy Holly CRNA  Preanesthetic Checklist  Completed: patient identified, IV checked, site marked, risks and benefits discussed, surgical consent, monitors and equipment checked, pre-op evaluation and timeout performed  Spinal Block Prep:  Patient Position:sitting  Sterile Tech:cap, gloves, mask and sterile barriers  Prep:DuraPrep  Patient Monitoring:blood pressure monitoring, continuous pulse oximetry and EKG  Spinal Block Procedure  Approach:midline  Guidance:palpation technique  Location:L3-L4  Needle Type:Pencan  Needle Gauge:25 G  Placement of Spinal needle event:cerebrospinal fluid aspirated  Paresthesia: no  Fluid Appearance:clear  Medications: bupivacaine PF (MARCAINE) 0.75%, 1.5 mL   Post Assessment  Patient Tolerance:patient tolerated the procedure well with no apparent complications  Complications no

## 2021-07-23 LAB
BASOPHILS # BLD AUTO: 0.05 10*3/MM3 (ref 0–0.2)
BASOPHILS NFR BLD AUTO: 0.4 % (ref 0–1.5)
DEPRECATED RDW RBC AUTO: 39.8 FL (ref 37–54)
EOSINOPHIL # BLD AUTO: 0.11 10*3/MM3 (ref 0–0.4)
EOSINOPHIL NFR BLD AUTO: 0.8 % (ref 0.3–6.2)
ERYTHROCYTE [DISTWIDTH] IN BLOOD BY AUTOMATED COUNT: 13.7 % (ref 12.3–15.4)
HCT VFR BLD AUTO: 29.1 % (ref 34–46.6)
HGB BLD-MCNC: 9.7 G/DL (ref 12–15.9)
IMM GRANULOCYTES # BLD AUTO: 0.08 10*3/MM3 (ref 0–0.05)
IMM GRANULOCYTES NFR BLD AUTO: 0.6 % (ref 0–0.5)
LYMPHOCYTES # BLD AUTO: 2.01 10*3/MM3 (ref 0.7–3.1)
LYMPHOCYTES NFR BLD AUTO: 14.6 % (ref 19.6–45.3)
MCH RBC QN AUTO: 27.1 PG (ref 26.6–33)
MCHC RBC AUTO-ENTMCNC: 33.3 G/DL (ref 31.5–35.7)
MCV RBC AUTO: 81.3 FL (ref 79–97)
MONOCYTES # BLD AUTO: 0.7 10*3/MM3 (ref 0.1–0.9)
MONOCYTES NFR BLD AUTO: 5.1 % (ref 5–12)
NEUTROPHILS NFR BLD AUTO: 10.79 10*3/MM3 (ref 1.7–7)
NEUTROPHILS NFR BLD AUTO: 78.5 % (ref 42.7–76)
NRBC BLD AUTO-RTO: 0 /100 WBC (ref 0–0.2)
PLATELET # BLD AUTO: 234 10*3/MM3 (ref 140–450)
PMV BLD AUTO: 11.1 FL (ref 6–12)
RBC # BLD AUTO: 3.58 10*6/MM3 (ref 3.77–5.28)
WBC # BLD AUTO: 13.74 10*3/MM3 (ref 3.4–10.8)

## 2021-07-23 PROCEDURE — 85025 COMPLETE CBC W/AUTO DIFF WBC: CPT | Performed by: OBSTETRICS & GYNECOLOGY

## 2021-07-23 PROCEDURE — 25010000002 KETOROLAC TROMETHAMINE PER 15 MG: Performed by: OBSTETRICS & GYNECOLOGY

## 2021-07-23 RX ADMIN — KETOROLAC TROMETHAMINE 30 MG: 30 INJECTION, SOLUTION INTRAMUSCULAR at 09:08

## 2021-07-23 RX ADMIN — OXYCODONE HYDROCHLORIDE AND ACETAMINOPHEN 2 TABLET: 7.5; 325 TABLET ORAL at 06:19

## 2021-07-23 RX ADMIN — OXYCODONE HYDROCHLORIDE AND ACETAMINOPHEN 2 TABLET: 7.5; 325 TABLET ORAL at 10:06

## 2021-07-23 RX ADMIN — PRENATAL VIT W/ FE FUMARATE-FA TAB 27-0.8 MG 1 TABLET: 27-0.8 TAB at 09:04

## 2021-07-23 RX ADMIN — OXYCODONE AND ACETAMINOPHEN 1 TABLET: 325; 10 TABLET ORAL at 15:58

## 2021-07-23 RX ADMIN — KETOROLAC TROMETHAMINE 30 MG: 30 INJECTION, SOLUTION INTRAMUSCULAR at 03:06

## 2021-07-23 RX ADMIN — KETOROLAC TROMETHAMINE 30 MG: 30 INJECTION, SOLUTION INTRAMUSCULAR at 15:58

## 2021-07-23 RX ADMIN — OXYCODONE AND ACETAMINOPHEN 1 TABLET: 325; 10 TABLET ORAL at 19:57

## 2021-07-23 NOTE — PLAN OF CARE
Goal Outcome Evaluation:   VSS, FF/ML/U2, scant lochia, catheter out at 0600 and pt DTV, ambulated in room, formula feeding infant, nausea at beginning of shift and has subsided, toradol and PO pain medication controlling pain well, responsive to infant cues

## 2021-07-23 NOTE — LACTATION NOTE
Pt chooses to formula feed.     Says she had wanted to bf in past , but her milk never comes in with each delivery. Offered to assist with bfing if changes mind. Benefits of bfing handout given.    Formula feeding safely and milk suppression teaching given with handouts on each. Mom appreciative.

## 2021-07-23 NOTE — PLAN OF CARE
Problem: Adult Inpatient Plan of Care  Goal: Plan of Care Review  Outcome: Ongoing, Progressing  Flowsheets (Taken 7/23/2021 1646)  Progress: improving  Plan of Care Reviewed With: patient  Outcome Summary: VSS WNL, fundus fml U1 scant lochia,  Incision dry and intact with steri strips.  Showered, Ambulating room.  Voiding   Goal Outcome Evaluation:  Plan of Care Reviewed With: patient        Progress: improving  Outcome Summary: VSS WNL, fundus fml U1 scant lochia,  Incision dry and intact with steri strips.  Showered, Ambulating room.  Voiding

## 2021-07-23 NOTE — PROGRESS NOTES
Jean-Claude Eisenberg MD  [facility]  [address]  [place] [address]  Office [phone]  Fax [phone]      [facility]   PROGRESS NOTE    Post-Op Day 1 S/P   Subjective     Patient reports:  Pain is well controlled with prescription NSAID's including ibuprofen (Motrin) and narcotic analgesics including Percocet.  She is ambulating. Tolerating diet. Voiding - without difficulty; flatus reported..  Vaginal bleeding is as much as expected.      Objective      Vitals: Vital Signs Range for the last 24 hours  Temperature: Temp:  [97.2 °F (36.2 °C)-98.4 °F (36.9 °C)] 98.2 °F (36.8 °C)   Temp Source: Temp src: Oral   BP: BP: (106-131)/(63-82) 122/81   Pulse: Heart Rate:  [61-87] 76   Respirations: Resp:  [16-20] 18   SPO2: SpO2:  [96 %-100 %] 98 %   O2 Amount (l/min):     O2 Devices Device (Oxygen Therapy): room air   Weight:              Physical Exam    Lungs clear to auscultation bilaterally   Abdomen Soft, non-tender, normal bowel sounds; no bruits, organomegaly or masses.   Incision  no drainage   Extremities extremities normal, atraumatic, no cyanosis or edema     I reviewed the patient's new clinical results.  Lab Results (last 24 hours)     Procedure Component Value Units Date/Time    CBC & Differential [[id number]]  (Abnormal) Collected: 21    Specimen: Blood Updated: 21    Narrative:      The following orders were created for panel order CBC & Differential.  Procedure                               Abnormality         Status                     ---------                               -----------         ------                     CBC Auto Differential[[id number]]        Abnormal            Final result                 Please view results for these tests on the individual orders.    CBC Auto Differential [[id number]]  (Abnormal) Collected: 21    Specimen: Blood Updated: 21     WBC 13.74 10*3/mm3      RBC 3.58 10*6/mm3      Hemoglobin  9.7 g/dL      Hematocrit 29.1 %      MCV 81.3 fL      MCH 27.1 pg      MCHC 33.3 g/dL      RDW 13.7 %      RDW-SD 39.8 fl      MPV 11.1 fL      Platelets 234 10*3/mm3      Neutrophil % 78.5 %      Lymphocyte % 14.6 %      Monocyte % 5.1 %      Eosinophil % 0.8 %      Basophil % 0.4 %      Immature Grans % 0.6 %      Neutrophils, Absolute 10.79 10*3/mm3      Lymphocytes, Absolute 2.01 10*3/mm3      Monocytes, Absolute 0.70 10*3/mm3      Eosinophils, Absolute 0.11 10*3/mm3      Basophils, Absolute 0.05 10*3/mm3      Immature Grans, Absolute 0.08 10*3/mm3      nRBC 0.0 /100 WBC     Tissue Pathology Exam [[id number]] Collected: 07/22/21 1403    Specimen: Tissue from Placenta; Tissue from Fallopian Tube, Left; Tissue from Fallopian Tube, Right Updated: 07/22/21 1556          External Prenatal Results     Pregnancy Outside Results - Transcribed From Office Records - See Scanned Records For Details     Test Value Date Time    ABO  B  07/22/21 1133    Rh  Positive  07/22/21 1133    Antibody Screen  Negative  07/22/21 1133       Negative  12/11/20 0904    Varicella IgG       Rubella  2.43 index 12/11/20 0904    Hgb  9.7 g/dL 07/23/21 0614       11.1 g/dL 07/22/21 1133       10.4 g/dL 05/04/21 1441       11.6 g/dL 12/11/20 0904    Hct  29.1 % 07/23/21 0614       33.2 % 07/22/21 1133       34.5 % 12/11/20 0904    Glucose Fasting GTT       Glucose Tolerance Test 1 hour       Glucose Tolerance Test 3 hour       Gonorrhea (discrete)  Negative  07/02/21 1259       Negative  12/11/20 0904    Chlamydia (discrete)  Negative  07/02/21 1259       Negative  12/11/20 0904    RPR  Non Reactive  12/11/20 0904    VDRL       Syphilis Antibody       HBsAg  Negative  12/11/20 0904    Herpes Simplex Virus PCR       Herpes Simplex VIrus Culture       HIV  Non Reactive  12/11/20 0904    Hep C RNA Quant PCR       Hep C Antibody       AFP       Group B Strep  Negative  07/02/21 1259    GBS Susceptibility to Clindamycin       GBS Susceptibility to  Erythromycin       Fetal Fibronectin       Genetic Testing, Maternal Blood             Drug Screening     Test Value Date Time    Urine Drug Screen       Amphetamine Screen       Barbiturate Screen       Benzodiazepine Screen       Methadone Screen       Phencyclidine Screen       Opiates Screen       THC Screen       Cocaine Screen       Propoxyphene Screen       Buprenorphine Screen       Methamphetamine Screen       Oxycodone Screen       Tricyclic Antidepressants Screen             Legend    ^: Historical                        Assessment/Plan        Prenatal care in third trimester    Previous  section    Encounter for sterilization      Assessment:    Bailee JENKINS Sloanepaulotrinamaynor is Day 1  post-partum  , Low Transverse   .       Plan:  continue post op care.        Jean-Claude Eisenberg MD  2021  07:12 CDT

## 2021-07-24 VITALS
SYSTOLIC BLOOD PRESSURE: 132 MMHG | OXYGEN SATURATION: 98 % | DIASTOLIC BLOOD PRESSURE: 78 MMHG | TEMPERATURE: 98.4 F | RESPIRATION RATE: 12 BRPM | HEART RATE: 87 BPM

## 2021-07-24 RX ORDER — OXYCODONE HYDROCHLORIDE AND ACETAMINOPHEN 5; 325 MG/1; MG/1
1 TABLET ORAL EVERY 4 HOURS PRN
Qty: 20 TABLET | Refills: 0 | Status: SHIPPED | OUTPATIENT
Start: 2021-07-24 | End: 2021-07-30

## 2021-07-24 RX ADMIN — OXYCODONE AND ACETAMINOPHEN 1 TABLET: 325; 10 TABLET ORAL at 04:06

## 2021-07-24 RX ADMIN — IBUPROFEN 800 MG: 800 TABLET, FILM COATED ORAL at 09:39

## 2021-07-24 RX ADMIN — DOCUSATE SODIUM 100 MG: 100 CAPSULE ORAL at 09:39

## 2021-07-24 RX ADMIN — PRENATAL VIT W/ FE FUMARATE-FA TAB 27-0.8 MG 1 TABLET: 27-0.8 TAB at 09:39

## 2021-07-24 RX ADMIN — OXYCODONE AND ACETAMINOPHEN 1 TABLET: 325; 10 TABLET ORAL at 09:39

## 2021-07-24 NOTE — DISCHARGE SUMMARY
Steve Stewart MD  [facility]  [address]  [place] [address]  Office [phone]  Fax [phone]    Discharge Summary     [place]  Bailee Phoenix  : 1990  MRN: [id number]  CSN: [id number]    Date of Admission: 2021   Date of Discharge:  2021   Delivering Physician: Ruthy Clinton DO       Admission Diagnosis: 1. Prenatal care in third trimester [Z34.93]  2. Previous  section [Z98.891]  3. Encounter for sterilization [Z30.2]   Discharge Diagnosis: 1. Pregnancy at 39w1d - delivered       Procedures: 1. Repeat  (LTCS)     Hospital Course  See the completed operative report for details regarding antepartum course and delivery.    Her post-operative course was unremarkable.  On POD # 2 she felt like she ready for discharge.  She was evaluated by Dr. Stewart who agreed she was able to be discharged to home.  She had no febrile morbidity. She had normal bowel and bladder function and was hemodynamically stable.  Her wound was healing well without obvious signs of infections.    Infant  male  fetus weighing 2680 g (5 lb 14.5 oz)   Apgars -  8 @ 1 minute /  9 @ 5 minutes.    Discharge labs  Lab Results   Component Value Date    WBC 13.74 (H) 2021    HGB 9.7 (L) 2021    HCT 29.1 (L) 2021     2021       Discharge Medications     Discharge Medications      New Medications      Instructions Start Date   oxyCODONE-acetaminophen 5-325 MG per tablet  Commonly known as: PERCOCET   1 tablet, Oral, Every 4 Hours PRN         Continue These Medications      Instructions Start Date   metoclopramide 10 MG tablet  Commonly known as: Reglan   10 mg, Oral, 4 Times Daily      PNV OB+DHA 27-1 & 250 MG misc   1 tablet, Oral, Daily             External Prenatal Results     Pregnancy Outside Results - Transcribed From Office Records - See Scanned Records For Details     Test Value Date Time    ABO  B   07/22/21 1133    Rh  Positive  07/22/21 1133    Antibody Screen  Negative  07/22/21 1133       Negative  12/11/20 0904    Varicella IgG       Rubella  2.43 index 12/11/20 0904    Hgb  9.7 g/dL 07/23/21 0614       11.1 g/dL 07/22/21 1133       10.4 g/dL 05/04/21 1441       11.6 g/dL 12/11/20 0904    Hct  29.1 % 07/23/21 0614       33.2 % 07/22/21 1133       34.5 % 12/11/20 0904    Glucose Fasting GTT       Glucose Tolerance Test 1 hour       Glucose Tolerance Test 3 hour       Gonorrhea (discrete)  Negative  07/02/21 1259       Negative  12/11/20 0904    Chlamydia (discrete)  Negative  07/02/21 1259       Negative  12/11/20 0904    RPR  Non Reactive  12/11/20 0904    VDRL       Syphilis Antibody       HBsAg  Negative  12/11/20 0904    Herpes Simplex Virus PCR       Herpes Simplex VIrus Culture       HIV  Non Reactive  12/11/20 0904    Hep C RNA Quant PCR       Hep C Antibody       AFP       Group B Strep  Negative  07/02/21 1259    GBS Susceptibility to Clindamycin       GBS Susceptibility to Erythromycin       Fetal Fibronectin       Genetic Testing, Maternal Blood             Drug Screening     Test Value Date Time    Urine Drug Screen       Amphetamine Screen       Barbiturate Screen       Benzodiazepine Screen       Methadone Screen       Phencyclidine Screen       Opiates Screen       THC Screen       Cocaine Screen       Propoxyphene Screen       Buprenorphine Screen       Methamphetamine Screen       Oxycodone Screen       Tricyclic Antidepressants Screen             Legend    ^: Historical                        Discharge Disposition Home or Self Care   Condition on Discharge: good   Follow-up: 2 weeks with Mary Beth Stewart MD  7/24/2021

## 2021-07-24 NOTE — PROGRESS NOTES
Steve Stewart MD  [facility]  [address]  [place] [address]  Office [phone]  Fax [phone]      [facility]   PROGRESS NOTE    Post-Op Day 2 S/P   Subjective     Patient reports:  Pain is well controlled with narcotic analgesics including oxycodone/acetaminophen (Percocet, Tylox).  She is ambulating. Tolerating diet. Voiding - without difficulty; flatus reported..  Vaginal bleeding is as much as expected.      Objective      Vitals: Vital Signs Range for the last 24 hours  Temperature: Temp:  [97.7 °F (36.5 °C)-98.4 °F (36.9 °C)] 98.4 °F (36.9 °C)   Temp Source: Temp src: Oral   BP: BP: (114-132)/(72-85) 132/78   Pulse: Heart Rate:  [75-98] 87   Respirations: Resp:  [12-18] 12   SPO2: SpO2:  [96 %-100 %] 98 %   O2 Amount (l/min):     O2 Devices Device (Oxygen Therapy): room air   Weight:              Physical Exam    Lungs clear to auscultation bilaterally   Abdomen Soft, approp tender   Incision  healing well, no drainage, no erythema, no swelling, well approximated   Extremities edema trace and Homans sign is negative, no sign of DVT     None  Lab Results (last 24 hours)     ** No results found for the last 24 hours. **          External Prenatal Results     Pregnancy Outside Results - Transcribed From Office Records - See Scanned Records For Details     Test Value Date Time    ABO  B  21 1133    Rh  Positive  21 1133    Antibody Screen  Negative  21 1133       Negative  20 0904    Varicella IgG       Rubella  2.43 index 20 0904    Hgb  9.7 g/dL 21 0614       11.1 g/dL 21 1133       10.4 g/dL 21 1441       11.6 g/dL 20 0904    Hct  29.1 % 21 0614       33.2 % 21 1133       34.5 % 20 0904    Glucose Fasting GTT       Glucose Tolerance Test 1 hour       Glucose Tolerance Test 3 hour       Gonorrhea (discrete)  Negative  21 1259       Negative  20 0904    Chlamydia (discrete)   Negative  21 1259       Negative  20 0904    RPR  Non Reactive  20 0904    VDRL       Syphilis Antibody       HBsAg  Negative  20 0904    Herpes Simplex Virus PCR       Herpes Simplex VIrus Culture       HIV  Non Reactive  20 0904    Hep C RNA Quant PCR       Hep C Antibody       AFP       Group B Strep  Negative  21 1259    GBS Susceptibility to Clindamycin       GBS Susceptibility to Erythromycin       Fetal Fibronectin       Genetic Testing, Maternal Blood             Drug Screening     Test Value Date Time    Urine Drug Screen       Amphetamine Screen       Barbiturate Screen       Benzodiazepine Screen       Methadone Screen       Phencyclidine Screen       Opiates Screen       THC Screen       Cocaine Screen       Propoxyphene Screen       Buprenorphine Screen       Methamphetamine Screen       Oxycodone Screen       Tricyclic Antidepressants Screen             Legend    ^: Historical                        Assessment/Plan        Prenatal care in third trimester    Previous  section    Encounter for sterilization      Assessment:    Bailee Phoenix is Day 2  post-partum  , Low Transverse   .       Plan:  plan for discharge today.        Steve Stewart MD  2021  10:10 CDT

## 2021-07-24 NOTE — PLAN OF CARE
Goal Outcome Evaluation:   VSS, FF/ML/U1, bleeding scant. Pain being controlled with medication.

## 2021-07-28 LAB
CYTO UR: NORMAL
LAB AP CASE REPORT: NORMAL
LAB AP CLINICAL INFORMATION: NORMAL
PATH REPORT.FINAL DX SPEC: NORMAL
PATH REPORT.GROSS SPEC: NORMAL

## 2021-08-05 ENCOUNTER — POSTPARTUM VISIT (OUTPATIENT)
Dept: OBSTETRICS AND GYNECOLOGY | Facility: CLINIC | Age: 31
End: 2021-08-05

## 2021-08-05 VITALS
DIASTOLIC BLOOD PRESSURE: 78 MMHG | SYSTOLIC BLOOD PRESSURE: 116 MMHG | WEIGHT: 166 LBS | BODY MASS INDEX: 29.41 KG/M2 | HEIGHT: 63 IN

## 2021-08-05 DIAGNOSIS — Z98.890 POSTOPERATIVE STATE: Primary | ICD-10-CM

## 2021-08-05 PROCEDURE — 0503F POSTPARTUM CARE VISIT: CPT | Performed by: OBSTETRICS & GYNECOLOGY

## 2021-08-05 NOTE — PROGRESS NOTES
"Subjective   Bailee Phoenix is a 31 y.o. female.     Chief Complaint   Patient presents with   • Postpartum Care     Patient here for 1st postpartum visit. Patient doing well and voices no new complaints or concerns.      31-year-old female  3 para 2-0-1-2 status post repeat  section with salpingectomy presents for follow-up.  Patient reports overall she is doing well.  She is currently bottlefeeding.  Her pain is well controlled.  She denies any lochia at this time.  She is urinating and ambulating without difficulty.  She reports regular bowel movements. She voices no other new complaints at this time.       Answers for HPI/ROS submitted by the patient on 2021  Please describe your symptoms.: 2 week follow up from  and tubal  Have you had these symptoms before?: No  How long have you been having these symptoms?: 1-2 weeks  Please list any medications you are currently taking for this condition.: Motrin  What is the primary reason for your visit?: Other        Review of Systems   Constitutional: Negative for chills and fever.   Genitourinary: Negative for vaginal discharge.       Objective   /78   Ht 160 cm (62.99\")   Wt 75.3 kg (166 lb)   LMP 10/29/2020   Breastfeeding No   BMI 29.41 kg/m²   Patient's last menstrual period was 10/29/2020.  Physical Exam  Vitals and nursing note reviewed. Exam conducted with a chaperone present.   Constitutional:       General: She is not in acute distress.     Appearance: She is well-developed.   HENT:      Head: Normocephalic and atraumatic.   Eyes:      General:         Right eye: No discharge.         Left eye: No discharge.      Conjunctiva/sclera: Conjunctivae normal.   Neck:      Thyroid: No thyromegaly.   Pulmonary:      Effort: Pulmonary effort is normal.   Abdominal:      General: There is no distension.      Palpations: Abdomen is soft. There is no mass.      Tenderness: There is no abdominal tenderness. There is no guarding or " rebound.      Hernia: No hernia is present.      Comments: Incision clean dry and intact well healed.    Musculoskeletal:         General: Normal range of motion.      Cervical back: Normal range of motion and neck supple.   Skin:     General: Skin is warm and dry.   Neurological:      Mental Status: She is alert and oriented to person, place, and time.   Psychiatric:         Behavior: Behavior normal.         Judgment: Judgment normal.       Assessment/Plan   Problems Addressed this Visit     None      Visit Diagnoses     Postoperative state    -  Primary    Encounter for postpartum visit          Diagnoses       Codes Comments    Postoperative state    -  Primary ICD-10-CM: Z98.890  ICD-9-CM: V45.89     Encounter for postpartum visit     ICD-10-CM: Z39.2  ICD-9-CM: V24.2       Return to clinic in 4 weeks for postpartum evaluation or sooner symptoms worsen.  All questions answered patient verbalized understanding of plan.       Ruthy Cilnton, DO

## 2021-09-02 ENCOUNTER — POSTPARTUM VISIT (OUTPATIENT)
Dept: OBSTETRICS AND GYNECOLOGY | Facility: CLINIC | Age: 31
End: 2021-09-02

## 2021-09-02 VITALS
SYSTOLIC BLOOD PRESSURE: 118 MMHG | HEIGHT: 63 IN | DIASTOLIC BLOOD PRESSURE: 70 MMHG | BODY MASS INDEX: 29.06 KG/M2 | WEIGHT: 164 LBS

## 2021-09-02 PROCEDURE — 0503F POSTPARTUM CARE VISIT: CPT | Performed by: OBSTETRICS & GYNECOLOGY

## 2021-09-02 NOTE — PROGRESS NOTES
"Subjective   Bailee Phoenix is a 31 y.o. female.     Chief Complaint   Patient presents with   • Postpartum Care     Patient here 6 weeks PP repeat  w/ tubal done . Patient states she is doing well. Patient states that occasionally she has slight pain with certain movements in her abdomen.      31 year old female  presents for follow up postoperative visit from a repeat  section with bilateral complete salpingectomy. She reports overall she is doing well. She reports pain is well controlled. She does report that she is having episodes of crying as well as emotional liability. She denies suicidal or homicidal ideation at this time.      Review of Systems   Constitutional: Negative for chills and fever.   Psychiatric/Behavioral: Positive for depressed mood. Negative for suicidal ideas.     Objective   /70   Ht 160 cm (63\")   Wt 74.4 kg (164 lb)   Breastfeeding No   BMI 29.05 kg/m²   No LMP recorded.  Physical Exam  Vitals and nursing note reviewed.   Constitutional:       General: She is not in acute distress.     Appearance: She is well-developed.   HENT:      Head: Normocephalic and atraumatic.   Eyes:      General:         Right eye: No discharge.      Conjunctiva/sclera: Conjunctivae normal.   Neck:      Thyroid: No thyromegaly.   Pulmonary:      Effort: Pulmonary effort is normal.   Abdominal:      General: There is no distension.      Palpations: Abdomen is soft. There is no mass.      Tenderness: There is no abdominal tenderness. There is no guarding or rebound.      Hernia: No hernia is present.      Comments: Incision clean dry and intact   Musculoskeletal:         General: Normal range of motion.      Cervical back: Normal range of motion and neck supple.   Skin:     General: Skin is warm and dry.   Neurological:      Mental Status: She is alert and oriented to person, place, and time.   Psychiatric:         Behavior: Behavior normal.         Judgment: Judgment normal. "       Assessment/Plan   Problems Addressed this Visit     None      Visit Diagnoses     Postpartum state    -  Primary    Postpartum depression        Relevant Medications    sertraline (Zoloft) 50 MG tablet      Diagnoses       Codes Comments    Postpartum state    -  Primary ICD-10-CM: Z39.2  ICD-9-CM: V24.2     Postpartum depression     ICD-10-CM: O99.345, F53.0  ICD-9-CM: 648.44, 311       Patient doing well at this time.   Sent Zoloft to pharmacy. Discussed with patient if her symptoms were to worsen she would need to seek immediate medical attention   RTC in 3 months for follow up or sooner if symptoms worsen.        Ruthy Clinton, DO

## 2021-12-02 ENCOUNTER — OFFICE VISIT (OUTPATIENT)
Dept: OBSTETRICS AND GYNECOLOGY | Facility: CLINIC | Age: 31
End: 2021-12-02

## 2021-12-02 VITALS
SYSTOLIC BLOOD PRESSURE: 116 MMHG | HEIGHT: 63 IN | BODY MASS INDEX: 29.59 KG/M2 | DIASTOLIC BLOOD PRESSURE: 78 MMHG | WEIGHT: 167 LBS

## 2021-12-02 DIAGNOSIS — N93.9 ABNORMAL UTERINE BLEEDING (AUB): Primary | ICD-10-CM

## 2021-12-02 DIAGNOSIS — R45.86 MOOD CHANGES: ICD-10-CM

## 2021-12-02 LAB
B-HCG UR QL: NEGATIVE
EXPIRATION DATE: NORMAL
INTERNAL NEGATIVE CONTROL: NORMAL
INTERNAL POSITIVE CONTROL: NORMAL
Lab: NORMAL

## 2021-12-02 PROCEDURE — 99214 OFFICE O/P EST MOD 30 MIN: CPT | Performed by: OBSTETRICS & GYNECOLOGY

## 2021-12-02 PROCEDURE — 81025 URINE PREGNANCY TEST: CPT | Performed by: OBSTETRICS & GYNECOLOGY

## 2021-12-02 RX ORDER — MEDROXYPROGESTERONE ACETATE 5 MG/1
5 TABLET ORAL DAILY
Qty: 7 TABLET | Refills: 0 | Status: SHIPPED | OUTPATIENT
Start: 2021-12-02

## 2021-12-02 RX ORDER — BUSPIRONE HYDROCHLORIDE 5 MG/1
5 TABLET ORAL 3 TIMES DAILY
Qty: 60 TABLET | Refills: 1 | Status: SHIPPED | OUTPATIENT
Start: 2021-12-02

## 2021-12-02 NOTE — PROGRESS NOTES
"Subjective   Bailee Phoenix is a 31 y.o. female.     Chief Complaint   Patient presents with   • Follow-up     Patient here for 3 month follow up on Zoloft used to treat PP depression.  Patient states she doesn't feel depressed but is having issues with her moods.    • Menstrual Problem     Patient has c/o having mulitple periods a month and passing clots since having her tubal during .        31 year old female  LMP 1 week ago presents for follow up on her menstrual cycles are well as mood disorder. Patient reports overall she thinks that the Zoloft is helping with her depression. She does report that she is having sudden mood swings which she feels like may be panic attacks. She denies SI or HI. She reports that she goes from happy to sad quickly. She reports that her cycles have been occurring more frequently. She reports that they normally last three to four days. She reports that at times she passes clots. She reports that all of this has happened since she delivered.      Review of Systems   Genitourinary: Positive for menstrual problem.   Psychiatric/Behavioral: Positive for agitation and behavioral problems.       Objective   /78   Ht 160 cm (63\")   Wt 75.8 kg (167 lb)   Breastfeeding No   BMI 29.58 kg/m²   No LMP recorded.  Physical Exam  Vitals and nursing note reviewed.   Constitutional:       General: She is not in acute distress.     Appearance: She is well-developed.   HENT:      Head: Normocephalic and atraumatic.   Eyes:      General:         Right eye: No discharge.         Left eye: No discharge.      Conjunctiva/sclera: Conjunctivae normal.   Pulmonary:      Effort: Pulmonary effort is normal.   Musculoskeletal:         General: Normal range of motion.      Cervical back: Normal range of motion and neck supple.   Skin:     General: Skin is warm and dry.   Neurological:      Mental Status: She is alert and oriented to person, place, and time.   Psychiatric:         " Behavior: Behavior normal.         Judgment: Judgment normal.       Assessment/Plan   Problems Addressed this Visit     None      Visit Diagnoses     Abnormal uterine bleeding (AUB)    -  Primary    Relevant Orders    TSH    T4, Free    POC Pregnancy, Urine (Completed)    Mood changes          Diagnoses       Codes Comments    Abnormal uterine bleeding (AUB)    -  Primary ICD-10-CM: N93.9  ICD-9-CM: 626.9     Mood changes     ICD-10-CM: R45.86  ICD-9-CM: 296.90       Will check TSH and free t4 today.   Discussed with patient different management options for cycle regulation.  Currently interested in being placed on combined oral contraceptives.  We will try Provera.  I however do not feel like her salpingectomy has affected her periods.  Bleeding well and in BuSpar since she feels like her mood swings could be panic attacks.  Discussed with patient if it were to make her symptoms worse she would need to return to clinic or seek immediate medical attention.  Offered patient referral to counseling and psychiatry patient declined.  All questions answered patient verbalized understanding of plan.       Ruthy Clinton, DO

## 2022-03-07 ENCOUNTER — OFFICE VISIT (OUTPATIENT)
Dept: OBGYN CLINIC | Age: 32
End: 2022-03-07
Payer: OTHER GOVERNMENT

## 2022-03-07 VITALS
HEART RATE: 88 BPM | HEIGHT: 63 IN | DIASTOLIC BLOOD PRESSURE: 78 MMHG | WEIGHT: 172 LBS | BODY MASS INDEX: 30.48 KG/M2 | SYSTOLIC BLOOD PRESSURE: 122 MMHG

## 2022-03-07 DIAGNOSIS — Z76.89 ENCOUNTER TO ESTABLISH CARE: Primary | ICD-10-CM

## 2022-03-07 DIAGNOSIS — N92.6 IRREGULAR MENSES: ICD-10-CM

## 2022-03-07 DIAGNOSIS — R10.2 PELVIC PAIN: ICD-10-CM

## 2022-03-07 LAB
BASOPHILS ABSOLUTE: 0.1 K/UL (ref 0–0.2)
BASOPHILS RELATIVE PERCENT: 0.9 % (ref 0–1)
EOSINOPHILS ABSOLUTE: 0.2 K/UL (ref 0–0.6)
EOSINOPHILS RELATIVE PERCENT: 2.3 % (ref 0–5)
FOLLICLE STIMULATING HORMONE: 5.6 MIU/ML
HCT VFR BLD CALC: 32 % (ref 37–47)
HEMOGLOBIN: 9.8 G/DL (ref 12–16)
IMMATURE GRANULOCYTES #: 0 K/UL
LYMPHOCYTES ABSOLUTE: 2.7 K/UL (ref 1.1–4.5)
LYMPHOCYTES RELATIVE PERCENT: 28.6 % (ref 20–40)
MCH RBC QN AUTO: 24.6 PG (ref 27–31)
MCHC RBC AUTO-ENTMCNC: 30.6 G/DL (ref 33–37)
MCV RBC AUTO: 80.4 FL (ref 81–99)
MONOCYTES ABSOLUTE: 0.7 K/UL (ref 0–0.9)
MONOCYTES RELATIVE PERCENT: 7.3 % (ref 0–10)
NEUTROPHILS ABSOLUTE: 5.6 K/UL (ref 1.5–7.5)
NEUTROPHILS RELATIVE PERCENT: 60.6 % (ref 50–65)
PDW BLD-RTO: 15.3 % (ref 11.5–14.5)
PLATELET # BLD: 296 K/UL (ref 130–400)
PMV BLD AUTO: 10.6 FL (ref 9.4–12.3)
PROLACTIN: 9.66 NG/ML (ref 4.79–23.3)
RBC # BLD: 3.98 M/UL (ref 4.2–5.4)
T4 FREE: 1.03 NG/DL (ref 0.93–1.7)
TESTOSTERONE TOTAL: 7.1 NG/DL (ref 8.4–48.1)
TSH SERPL DL<=0.05 MIU/L-ACNC: 1.18 UIU/ML (ref 0.27–4.2)
WBC # BLD: 9.3 K/UL (ref 4.8–10.8)

## 2022-03-07 PROCEDURE — 99203 OFFICE O/P NEW LOW 30 MIN: CPT | Performed by: NURSE PRACTITIONER

## 2022-03-07 ASSESSMENT — ENCOUNTER SYMPTOMS
DIARRHEA: 0
GASTROINTESTINAL NEGATIVE: 1
RESPIRATORY NEGATIVE: 1
EYES NEGATIVE: 1
ALLERGIC/IMMUNOLOGIC NEGATIVE: 1
CONSTIPATION: 0

## 2022-03-07 NOTE — PATIENT INSTRUCTIONS
Patient Education        Heavy Menstrual Periods: Care Instructions  Overview     With heavy menstrual periods, your bleeding may be heavier or last longer than normal. You may pass large blood clots and have to change sanitary pads or tampons often. Or your periods may last longer than 7 days. Heavy bleeding can be caused by not ovulating regularly. It can also be caused by other problems, such as fibroids (growths that aren't cancer). If you are overweight, you may be more likely to have heavy menstrual periods. But in some cases, there may not be a specific cause for your heavy periods. Your doctor may recommend hormone treatments to slow or stop your periods. If you have a fibroid, your doctor may recommend surgery or other treatments to remove the growth. Because blood loss from heavy periods can make you very tired and weak (anemic), your doctor may recommend that you take extra iron. Follow-up care is a key part of your treatment and safety. Be sure to make and go to all appointments, and call your doctor if you are having problems. It's also a good idea to know your test results and keep a list of the medicines you take. How can you care for yourself at home? · Get plenty of rest.  · Keep a record of your periods. Write down when your period begins and ends and how much flow you have. That means counting the number of pads and tampons you use. Note whether they are soaked. Note any other symptoms. Take this record to your doctor appointments. · Take your medicines exactly as prescribed. Call your doctor if you think you are having a problem with your medicine. · Take pain medicines exactly as directed. ? If the doctor gave you a prescription medicine for pain, take it as prescribed. ? If you are not taking a prescription pain medicine, ask your doctor if you can take an over-the-counter medicine. · Try to reach a healthy weight.  If you are trying to lose weight, do it slowly with your doctor's advice. · If you are taking iron pills:  ? Try to take the pills about 1 hour before or 2 hours after meals. But you may need to take iron with some food to avoid an upset stomach. ? Vitamin C (from food or pills) helps your body absorb iron. Try taking iron pills with a glass of orange juice or other citrus fruit juice. ? Do not take antacids or drink milk or caffeine drinks (such as coffee, tea, or cola) at the same time or within 2 hours of the time that you take your iron. They can make it hard for your body to absorb the iron. ? Iron pills may cause stomach problems, such as heartburn, nausea, diarrhea, constipation, and cramps. Be sure to drink plenty of fluids, and include fruits, vegetables, and fiber in your diet each day. ? If you forget to take an iron pill, do not take a double dose of iron the next time you take a pill. ? Keep iron pills out of the reach of small children. An overdose of iron can be very dangerous. When should you call for help? Call 911 anytime you think you may need emergency care. For example, call if:    · You passed out (lost consciousness). Call your doctor now or seek immediate medical care if:    · You have new or worse belly or pelvic pain.     · You have severe vaginal bleeding.     · You feel dizzy or lightheaded, or you feel like you may faint. Watch closely for changes in your health, and be sure to contact your doctor if:    · You think you may be pregnant.     · Your bleeding gets worse.     · You do not get better as expected. Where can you learn more? Go to https://jenn.[employer]. org and sign in to your [employer] account. Enter F477 in the [employer] box to learn more about \"Heavy Menstrual Periods: Care Instructions. \"     If you do not have an account, please click on the \"Sign Up Now\" link. Current as of: February 11, 2021               Content Version: 13.1  © 5803-4138 [employer].    Care instructions adapted under license by [facility]. If you have questions about a medical condition or this instruction, always ask your healthcare professional. [place] [address] any warranty or liability for your use of this information.

## 2022-03-07 NOTE — PROGRESS NOTES
Pt is here to establish care. She states her periods are very irregular. She is up to date on her pap.

## 2022-03-07 NOTE — PROGRESS NOTES
Sandra Mckay is a 36 y.o. female who presents today for her medical conditions/ complaints as noted below. Sandra Mckay is c/o of Establish Care        HPI  New pt presents to establish care. Having heavy, irregular periods and increase in pelvic pain and cramping all through out the cycle after having baby 7 months ago. Had repeat CD and BTL. Started having 2 periods per month- will bleed 7 days, pass clots and sometimes will have to call in to work because the bleeding is so heavy. Will bleed through tampons and pads. Feeling more tired and \"hormonal\" all the time. Questions what further testing can be done and treatment options. Patient's last menstrual period was 2022 (approximate). No obstetric history on file. History reviewed. No pertinent past medical history. Past Surgical History:   Procedure Laterality Date    ANTERIOR CRUCIATE LIGAMENT REPAIR      2006     SECTION      x's 2    CHOLECYSTECTOMY      COLONOSCOPY  2014    [place]    GALLBLADDER SURGERY      May 2013    TUBAL LIGATION      UPPER GASTROINTESTINAL ENDOSCOPY  2013         Family History   Problem Relation Age of Onset    Ulcerative Colitis Neg Hx     Uterine Cancer Neg Hx     Stomach Cancer Neg Hx     Rectal Cancer Neg Hx     Prostate Cancer Neg Hx     Liver Cancer Neg Hx     Irritable Bowel Syndrome Neg Hx     Inflam Bowel Dis Neg Hx     Esophageal Cancer Neg Hx     Crohn's Disease Neg Hx     Colon Polyps Neg Hx     Colon Cancer Neg Hx      Social History     Tobacco Use    Smoking status: Never Smoker    Smokeless tobacco: Never Used   Substance Use Topics    Alcohol use: No       No current outpatient medications on file. No current facility-administered medications for this visit. No Known Allergies  Vitals:    22 1412   BP: 122/78   Pulse: 88     Body mass index is 30.47 kg/m². Review of Systems   Constitutional: Positive for fatigue.    HENT: Negative. Eyes: Negative. Respiratory: Negative. Cardiovascular: Negative. Gastrointestinal: Negative. Negative for constipation and diarrhea. Endocrine: Negative. Genitourinary: Positive for menstrual problem and pelvic pain. Negative for frequency and urgency. Musculoskeletal: Negative. Skin: Negative. Allergic/Immunologic: Negative. Neurological: Negative. Hematological: Negative. Psychiatric/Behavioral: Negative. All other systems reviewed and are negative. Physical Exam  Vitals and nursing note reviewed. Constitutional:       Appearance: She is well-developed. HENT:      Head: Normocephalic. Right Ear: External ear normal.      Left Ear: External ear normal.      Nose: Nose normal.   Neck:      Thyroid: No thyromegaly. Cardiovascular:      Rate and Rhythm: Normal rate and regular rhythm. Pulmonary:      Effort: Pulmonary effort is normal.      Breath sounds: Normal breath sounds. Abdominal:      Palpations: Abdomen is soft. There is no mass. Tenderness: There is no abdominal tenderness. Genitourinary:     General: Normal vulva. Vagina: Normal.      Cervix: Normal.      Uterus: Tender. Adnexa:         Right: Tenderness present. No mass. Left: No mass or tenderness. Musculoskeletal:         General: Normal range of motion. Cervical back: Normal range of motion and neck supple. Skin:     General: Skin is warm and dry. Neurological:      Mental Status: She is alert and oriented to person, place, and time. Diagnosis Orders   1. Encounter to establish care     2. Irregular menses  CBC with Auto Differential    Follicle Stimulating Hormone    Prolactin    T4, Free    TSH    Testosterone    US NON OB TRANSVAGINAL   3. Pelvic pain         MEDICATIONS:  No orders of the defined types were placed in this encounter.       ORDERS:  Orders Placed This Encounter   Procedures    US NON OB TRANSVAGINAL    CBC with Auto

## 2022-03-15 ENCOUNTER — HOSPITAL ENCOUNTER (OUTPATIENT)
Dept: ULTRASOUND IMAGING | Age: 32
Discharge: HOME | End: 2022-03-15
Payer: OTHER GOVERNMENT

## 2022-03-15 DIAGNOSIS — N92.6 IRREGULAR MENSES: ICD-10-CM

## 2022-03-15 PROCEDURE — 76830 TRANSVAGINAL US NON-OB: CPT

## 2022-06-17 ENCOUNTER — OFFICE VISIT (OUTPATIENT)
Dept: OBGYN CLINIC | Age: 32
End: 2022-06-17
Payer: OTHER GOVERNMENT

## 2022-06-17 VITALS
DIASTOLIC BLOOD PRESSURE: 79 MMHG | HEIGHT: 63 IN | WEIGHT: 168 LBS | SYSTOLIC BLOOD PRESSURE: 118 MMHG | HEART RATE: 92 BPM | BODY MASS INDEX: 29.77 KG/M2

## 2022-06-17 DIAGNOSIS — D50.0 IRON DEFICIENCY ANEMIA DUE TO CHRONIC BLOOD LOSS: ICD-10-CM

## 2022-06-17 DIAGNOSIS — N92.1 MENORRHAGIA WITH IRREGULAR CYCLE: ICD-10-CM

## 2022-06-17 DIAGNOSIS — R10.2 PELVIC PAIN: ICD-10-CM

## 2022-06-17 DIAGNOSIS — N92.1 MENORRHAGIA WITH IRREGULAR CYCLE: Primary | ICD-10-CM

## 2022-06-17 DIAGNOSIS — N92.6 IRREGULAR MENSES: ICD-10-CM

## 2022-06-17 LAB
HCT VFR BLD CALC: 34.6 % (ref 37–47)
HEMOGLOBIN: 10.6 G/DL (ref 12–16)
MCH RBC QN AUTO: 24.3 PG (ref 27–31)
MCHC RBC AUTO-ENTMCNC: 30.6 G/DL (ref 33–37)
MCV RBC AUTO: 79.4 FL (ref 81–99)
PDW BLD-RTO: 14.8 % (ref 11.5–14.5)
PLATELET # BLD: 305 K/UL (ref 130–400)
PMV BLD AUTO: 11.4 FL (ref 9.4–12.3)
RBC # BLD: 4.36 M/UL (ref 4.2–5.4)
WBC # BLD: 9.8 K/UL (ref 4.8–10.8)

## 2022-06-17 PROCEDURE — 99213 OFFICE O/P EST LOW 20 MIN: CPT | Performed by: NURSE PRACTITIONER

## 2022-06-17 ASSESSMENT — ENCOUNTER SYMPTOMS
EYES NEGATIVE: 1
DIARRHEA: 0
RESPIRATORY NEGATIVE: 1
GASTROINTESTINAL NEGATIVE: 1
CONSTIPATION: 0
ALLERGIC/IMMUNOLOGIC NEGATIVE: 1

## 2022-06-17 NOTE — Clinical Note
Pt desires partial hyst for heavy bleeding and anemia. Has tried and failed OCP. Does not desire IUD.  Thanks ML

## 2022-06-17 NOTE — PATIENT INSTRUCTIONS
Patient Education        Pelvic Pain: Care Instructions  Your Care Instructions     Pelvic pain, or pain in the lower belly, can have many causes. Often pelvic pain is not serious and gets better in a few days. If your pain continues or gets worse, you may need tests and treatment. Tell your doctor about any newsymptoms. These may be signs of a serious problem. Follow-up care is a key part of your treatment and safety. Be sure to make and go to all appointments, and call your doctor if you are having problems. It's also a good idea to know your test results and keep alist of the medicines you take. How can you care for yourself at home?  Rest until you feel better. Lie down, and raise your legs by placing a pillow under your knees.  Drink plenty of fluids. You may find that small, frequent sips are easier on your stomach than if you drink a lot at once. Avoid drinks with carbonation or caffeine, such as soda pop, tea, or coffee.  Try eating several small meals instead of 2 or 3 large ones. Eat mild foods, such as rice, dry toast or crackers, bananas, and applesauce. Avoid fatty and spicy foods, other fruits, and alcohol until 48 hours after your symptoms have gone away.  Take an over-the-counter pain medicine, such as acetaminophen (Tylenol), ibuprofen (Advil, Motrin), or naproxen (Aleve). Read and follow all instructions on the label.  Do not take two or more pain medicines at the same time unless the doctor told you to. Many pain medicines have acetaminophen, which is Tylenol. Too much acetaminophen (Tylenol) can be harmful.  You can put a heating pad, a warm cloth, or moist heat on your belly to relieve pain. When should you call for help? Call 911  anytime you think you may need emergency care. For example, call if:     You passed out (lost consciousness).    Call your doctor now or seek immediate medical care if:     You have a new or higher fever.      You have unusual vaginal bleeding.      You have new or worse belly or pelvic pain.      You have vaginal discharge that has increased in amount or smells bad.      You are dizzy or lightheaded, or you feel like you may faint.      You have symptoms of sepsis, such as:  ? Shortness of breath. ? Feeling very sick. ? Severe pain. ? A fast heart rate. ? Cool, pale, or clammy skin. ? Feeling confused. ? Feeling very sleepy, or you are hard to wake up. Watch closely for changes in your health, and be sure to contact your doctor if:     You do not get better as expected. Where can you learn more? Go to https://chpepiceweb.healthSmalldeals. org and sign in to your Ganjiwang account. Enter [phone] in the In[employer]iatric[employer] box to learn more about \"Pelvic Pain: Care Instructions. \"     If you do not have an account, please click on the \"Sign Up Now\" link. Current as of: November 22, 2021               Content Version: 13.2  © 3338-1320 diaDexus. Care instructions adapted under license by [facility] [place] ([facility]). If you have questions about a medical condition or this instruction, always ask your healthcare professional. [place] [address] any warranty or liability for your use of this information. Patient Education        Heavy Menstrual Periods: Care Instructions  Overview     With heavy menstrual periods, your bleeding may be heavier or last longer than normal. You may pass large blood clots and have to change sanitary pads ortampons often. Or your periods may last longer than 7 days. Heavy bleeding can be caused by not ovulating regularly. It can also be caused by other problems, such as fibroids (growths that aren't cancer). If you are overweight, you may be more likely to have heavy menstrual periods. But in somecases, there may not be a specific cause for your heavy periods. Your doctor may recommend hormone treatments to slow or stop your periods.  If you have a fibroid, your doctor may recommend surgery or other treatments to remove the growth. Because blood loss from heavy periods can make you verytired and weak (anemic), your doctor may recommend that you take extra iron. Follow-up care is a key part of your treatment and safety. Be sure to make and go to all appointments, and call your doctor if you are having problems. It's also a good idea to know your test results and keep alist of the medicines you take. How can you care for yourself at home?  Get plenty of rest.   Keep a record of your periods. Write down when your period begins and ends and how much flow you have. That means counting the number of pads and tampons you use. Note whether they are soaked. Note any other symptoms. Take this record to your doctor appointments.  Take your medicines exactly as prescribed. Call your doctor if you think you are having a problem with your medicine.  Take pain medicines exactly as directed. ? If the doctor gave you a prescription medicine for pain, take it as prescribed. ? If you are not taking a prescription pain medicine, ask your doctor if you can take an over-the-counter medicine.  Try to reach a healthy weight. If you are trying to lose weight, do it slowly with your doctor's advice.  If you are taking iron pills:  ? Try to take the pills about 1 hour before or 2 hours after meals. But you may need to take iron with some food to avoid an upset stomach. ? Vitamin C (from food or pills) helps your body absorb iron. Try taking iron pills with a glass of orange juice or other citrus fruit juice. ? Do not take antacids or drink milk or caffeine drinks (such as coffee, tea, or cola) at the same time or within 2 hours of the time that you take your iron. They can make it hard for your body to absorb the iron. ? Iron pills may cause stomach problems, such as heartburn, nausea, diarrhea, constipation, and cramps.  Be sure to drink plenty of fluids, and include fruits, vegetables, and fiber in your diet each day.  ? If you forget to take an iron pill, do not take a double dose of iron the next time you take a pill. ? Keep iron pills out of the reach of small children. An overdose of iron can be very dangerous. When should you call for help? Call 911 anytime you think you may need emergency care. For example, call if:     You passed out (lost consciousness). Call your doctor now or seek immediate medical care if:     You have new or worse belly or pelvic pain.      You have severe vaginal bleeding.      You feel dizzy or lightheaded, or you feel like you may faint. Watch closely for changes in your health, and be sure to contact your doctor if:     You think you may be pregnant.      Your bleeding gets worse.      You do not get better as expected. Where can you learn more? Go to https://chedil.Welcome Real-time. org and sign in to your DxTerity account. Enter F477 in the [employer] box to learn more about \"Heavy Menstrual Periods: Care Instructions. \"     If you do not have an account, please click on the \"Sign Up Now\" link. Current as of: November 22, 2021               Content Version: 13.2  © 8030-3532 Birst. Care instructions adapted under license by [facility] ([facility]). If you have questions about a medical condition or this instruction, always ask your healthcare professional. [address] any warranty or liability for your use of this information. Patient Education        Learning About Hysterectomy Surgery  What is a hysterectomy? A hysterectomy is surgery to take out the uterus. Sometimes the ovaries andfallopian tubes also are removed at the same time. How is this surgery done? There are many ways to do the surgery. The type you have may depend on your medical condition and the size and position of your uterus. It also depends onyour overall health. Talk with your doctor about which type is right for you.   Abdominal surgery  This is done through a cut that the doctor makes in the lower belly. The cut is called an incision. The doctor takes out the uterus through this cut in thebelly. Vaginal surgery  This is done through the vagina. The doctor makes a small cut in the vaginainstead of the belly. The uterus is removed through this cut in the vagina. Laparoscopic surgery  The doctor puts a lighted tube (laparoscope) through small cuts in the belly. The doctor can see your organs with the scope. The doctor can insert surgical tools to cut the tissue that holds your uterus in place. Then the uterus is removed. It may be removed through small cuts in the belly. (This is called laparoscopic abdominal hysterectomy.) Or it may be removed through the vagina. (This is called laparoscopically assisted vaginal hysterectomy.)  What can you expect after your surgery? You might go home the day of your hysterectomy or stay in the hospital for several days. Recovery can take 4 to 6 weeks. It depends on which type of surgery you have and your overall health. You won't be able to do any heavy lifting. And you will have to take it easy for a few weeks. It's common to feelmore tired than usual.  After surgery, you will no longer have periods. You won't be able to get pregnant. If there's a chance that you will want to get pregnant in the future,talk to your doctor about other treatment options. Most people can have sex without problems after they recover from surgery. But if you have your ovaries removed, you may have vaginal dryness after the surgery. It can make sex less comfortable. A vaginal lubricant, such asAstroglide or K-Y Jelly, can help. You may need to take hormones after your surgery if your ovaries are removed and you haven't gone through menopause. Taking out the ovaries before menopause causes a sudden drop in the hormone estrogen. Talk with your doctor about thepros and cons of taking hormones if you have your ovaries removed.   Follow-up care is a key part of your treatment and safety. Be sure to make and go to all appointments, and call your doctor if you are having problems. It is also a good idea to know your test results and keep alist of the medicines you take. Where can you learn more? Go to https://chpeflorianeweb.Pelotonics. org and sign in to your [employer]ria [employer] account. Enter H610 in the [facility] box to learn more about \"Learning About Hysterectomy Surgery. \"     If you do not have an account, please click on the \"Sign Up Now\" link. Current as of: November 22, 2021               Content Version: 13.2  © 0441-9168 Healthwise, Incorporated. Care instructions adapted under license by [facility] ([facility]). If you have questions about a medical condition or this instruction, always ask your healthcare professional. [address] any warranty or liability for your use of this information.

## 2022-06-17 NOTE — PROGRESS NOTES
Pt states she is still having heavy irregular bleeding and pelvic pain. She had an us done on 03/15/2022. She is wanting to discuss having a partial hyst the OCP is not helping.

## 2022-06-17 NOTE — PROGRESS NOTES
Kandi Burton is a 29 y.o. female who presents today for her medical conditions/ complaints as noted below. Kandi Burton is c/o of Menstrual Problem and Pelvic Pain        HPI  Pt presents for 3 month f/u since starting OCP for heavy, irregular, painful periods. Had a normal period the first pack, but then had 2 periods a month and passed clots. Feels tired. Was anemic 3 months ago and has been taking iron OTC. Has had BTL, finished with future childbearing. Desires hysterectomy. Does not desire IUD for management. Examination. US NON OB TRANSVAGINAL 3/15/2022 2:34 PM   History: Irregular menstrual cycles. The transvaginal sonography of the pelvis is performed. There is no   previous study for comparison. The uterus measures 6.1 x 3.5 x 3.9 cm. No intrinsic abnormality or a   mass. The endometrium measures 8 mm in thickness. Normal ovaries bilaterally is seen. Normal blood flow.       Impression   1. Unremarkable transvaginal sonography of the pelvis. Signed by Dr Yani Bragg       Patient's last menstrual period was 2022 (approximate). No obstetric history on file. History reviewed. No pertinent past medical history.   Past Surgical History:   Procedure Laterality Date    ANTERIOR CRUCIATE LIGAMENT REPAIR      2006     SECTION      x's 2    CHOLECYSTECTOMY      COLONOSCOPY  2014    [place]    GALLBLADDER SURGERY      May 2013    TUBAL LIGATION      UPPER GASTROINTESTINAL ENDOSCOPY  2013         Family History   Problem Relation Age of Onset    Ulcerative Colitis Neg Hx     Uterine Cancer Neg Hx     Stomach Cancer Neg Hx     Rectal Cancer Neg Hx     Prostate Cancer Neg Hx     Liver Cancer Neg Hx     Irritable Bowel Syndrome Neg Hx     Inflam Bowel Dis Neg Hx     Esophageal Cancer Neg Hx     Crohn's Disease Neg Hx     Colon Polyps Neg Hx     Colon Cancer Neg Hx      Social History     Tobacco Use    Smoking status: Never Smoker    Smokeless tobacco: Never Used   Substance Use Topics    Alcohol use: No       Current Outpatient Medications   Medication Sig Dispense Refill    norgestimate-ethinyl estradiol (ORTHO-CYCLEN, 28,) 0.25-35 MG-MCG per tablet Take 1 tablet by mouth daily 1 packet 6     No current facility-administered medications for this visit. No Known Allergies  Vitals:    06/17/22 1308   BP: 118/79   Pulse: 92     Body mass index is 29.76 kg/m². Review of Systems   Constitutional: Positive for fatigue. HENT: Negative. Eyes: Negative. Respiratory: Negative. Cardiovascular: Negative. Gastrointestinal: Negative. Negative for constipation and diarrhea. Endocrine: Negative. Genitourinary: Positive for menstrual problem and pelvic pain. Negative for frequency and urgency. Musculoskeletal: Negative. Skin: Negative. Allergic/Immunologic: Negative. Neurological: Negative. Hematological: Negative. Psychiatric/Behavioral: Negative. All other systems reviewed and are negative. Physical Exam  Vitals and nursing note reviewed. Constitutional:       Appearance: She is well-developed. HENT:      Head: Normocephalic. Right Ear: External ear normal.      Left Ear: External ear normal.      Nose: Nose normal.   Musculoskeletal:         General: Normal range of motion. Cervical back: Normal range of motion. Skin:     General: Skin is warm and dry. Neurological:      Mental Status: She is alert and oriented to person, place, and time. Psychiatric:         Attention and Perception: Attention normal.         Mood and Affect: Mood normal.         Speech: Speech normal.         Behavior: Behavior normal.         Thought Content: Thought content normal.         Cognition and Memory: Cognition normal.         Judgment: Judgment normal.          Diagnosis Orders   1. Irregular menses     2. Pelvic pain     3.  Menorrhagia with irregular cycle  CBC       MEDICATIONS:  No orders of the call for help? Call 911  anytime you think you may need emergency care. For example, call if:     You passed out (lost consciousness). Call your doctor now or seek immediate medical care if:     You have a new or higher fever.      You have unusual vaginal bleeding.      You have new or worse belly or pelvic pain.      You have vaginal discharge that has increased in amount or smells bad.      You are dizzy or lightheaded, or you feel like you may faint.      You have symptoms of sepsis, such as:  ? Shortness of breath. ? Feeling very sick. ? Severe pain. ? A fast heart rate. ? Cool, pale, or clammy skin. ? Feeling confused. ? Feeling very sleepy, or you are hard to wake up. Watch closely for changes in your health, and be sure to contact your doctor if:     You do not get better as expected. Where can you learn more? Go to https://Watly BVpepiceweb.Canvera Digital Technologies. org and sign in to your [employer]ifact Technologies account. Enter [phone] in the [employer]k box to learn more about \"Pelvic Pain: Care Instructions. \"     If you do not have an account, please click on the \"Sign Up Now\" link. Current as of: November 22, 2021               Content Version: 13.2  © 2006-2022 Healthwise, Incorporated. Care instructions adapted under license by [facility] ([facility]). If you have questions about a medical condition or this instruction, always ask your healthcare professional. [place] [address] any warranty or liability for your use of this information.

## 2022-06-20 ENCOUNTER — TELEPHONE (OUTPATIENT)
Dept: OBGYN CLINIC | Age: 32
End: 2022-06-20

## 2022-06-30 ENCOUNTER — OFFICE VISIT (OUTPATIENT)
Dept: OBGYN CLINIC | Age: 32
End: 2022-06-30
Payer: OTHER GOVERNMENT

## 2022-06-30 VITALS
BODY MASS INDEX: 30.12 KG/M2 | HEIGHT: 63 IN | SYSTOLIC BLOOD PRESSURE: 130 MMHG | DIASTOLIC BLOOD PRESSURE: 84 MMHG | WEIGHT: 170 LBS

## 2022-06-30 DIAGNOSIS — N94.10 DYSPAREUNIA IN FEMALE: ICD-10-CM

## 2022-06-30 DIAGNOSIS — N92.6 IRREGULAR MENSES: ICD-10-CM

## 2022-06-30 DIAGNOSIS — R10.2 PELVIC PAIN IN FEMALE: ICD-10-CM

## 2022-06-30 DIAGNOSIS — D50.0 IRON DEFICIENCY ANEMIA DUE TO CHRONIC BLOOD LOSS: ICD-10-CM

## 2022-06-30 DIAGNOSIS — R10.2 PELVIC PAIN: ICD-10-CM

## 2022-06-30 DIAGNOSIS — N92.1 MENORRHAGIA WITH IRREGULAR CYCLE: Primary | ICD-10-CM

## 2022-06-30 PROCEDURE — 99214 OFFICE O/P EST MOD 30 MIN: CPT | Performed by: OBSTETRICS & GYNECOLOGY

## 2022-06-30 ASSESSMENT — ENCOUNTER SYMPTOMS
RESPIRATORY NEGATIVE: 1
EYES NEGATIVE: 1
GASTROINTESTINAL NEGATIVE: 1

## 2022-06-30 NOTE — PATIENT INSTRUCTIONS
Patient Education        Laparoscopic Hysterectomy: What to Expect at Home  Your Recovery     A laparoscopic hysterectomy is surgery to take out the uterus. Your doctor put a lighted tube and surgical tools through small cuts in your belly to removethe uterus. You can expect to feel better and stronger each day. But you might need pain medicine for a week or two. You may get tired easily or have less energy than usual. The tiredness may last for several weeks after surgery. And you also mayhave light vaginal bleeding for a few weeks. It's important to avoid lifting while you are recovering so that you can heal. It may take about 4 to 6 weeks to fully recover. The recovery time may beshorter for some people. This care sheet gives you a general idea about how long it will take for you to recover. But each person recovers at a different pace. Follow the steps belowto get better as quickly as possible. How can you care for yourself at home? Activity     Rest when you feel tired. Getting enough sleep will help you recover.      Try to walk each day. Start by walking a little more than you did the day before. Bit by bit, increase the amount you walk. Walking boosts blood flow and helps prevent pneumonia and constipation.      Avoid lifting anything that would make you strain. This may include heavy grocery bags and milk containers, a heavy briefcase or backpack, bags of cat litter or dog food, a vacuum , or a child.      Avoid strenuous activities, such as biking, jogging, weight lifting, or aerobic exercise, until your doctor says it is okay.      Ask your doctor when you can drive again.      You may shower 24 to 48 hours after surgery, if your doctor okays it. Pat the incision dry. Do not take a bath for the first 2 weeks, or until your doctor tells you it is okay.      Ask your doctor when it is okay for you to have sex. Diet     You can eat your normal diet.  If your stomach is upset, try bland, low-fat foods like plain rice, broiled chicken, toast, and yogurt.      If your bowel movements are not regular right after surgery, try to avoid constipation and straining. Drink plenty of water. Your doctor may suggest fiber, a stool softener, or a mild laxative. Medicines     Your doctor will tell you if and when you can restart your medicines. You will also get instructions about taking any new medicines.      If you take aspirin or some other blood thinner, ask your doctor if and when to start taking it again. Make sure that you understand exactly what your doctor wants you to do.      Be safe with medicines. Read and follow all instructions on the label. ? If the doctor gave you a prescription medicine for pain, take it as prescribed. ? If you are not taking a prescription pain medicine, ask your doctor if you can take an over-the-counter medicine.      If your doctor prescribed antibiotics, take them as directed. Do not stop taking them just because you feel better. You need to take the full course of antibiotics. Incision care     You may have stitches over the cuts (incisions) the doctor made in your belly.      If you have strips of tape on the cut (incision) the doctor made, leave the tape on for a week or until it falls off.      Wash the area daily with warm, soapy water, and pat it dry. Don't use hydrogen peroxide or alcohol. They can slow healing.      You may cover the area with a gauze bandage if it oozes fluid or rubs against clothing.      Change the bandage every day. Other instructions     You may have some light vaginal bleeding. Wear sanitary pads if needed. Do not douche or use tampons. Follow-up care is a key part of your treatment and safety. Be sure to make and go to all appointments, and call your doctor if you are having problems. It's also a good idea to know your test results and keep alist of the medicines you take. When should you call for help?    Call [phone] anytime you think you may need emergency care. For example, call if:     You passed out (lost consciousness).      You have chest pain, are short of breath, or cough up blood. Call your doctor now or seek immediate medical care if:     You have pain that does not get better after you take pain medicine.      You cannot pass stools or gas.      You have vaginal discharge that has increased in amount or smells bad.      You are sick to your stomach or cannot drink fluids.      You have loose stitches, or your incision comes open.      Bright red blood has soaked through the bandage over your incision.      You have signs of infection, such as:  ? Increased pain, swelling, warmth, or redness. ? Red streaks leading from the incision. ? Pus draining from the incision. ? A fever.      You have severe vaginal bleeding. This means that you are soaking through your usual pads every hour for 2 or more hours.      You have signs of a blood clot in your leg (called a deep vein thrombosis), such as:  ? Pain in your calf, back of the knee, thigh, or groin. ? Redness and swelling in your leg or groin. Watch closely for changes in your health, and be sure to contact your doctor ifyou have any problems. Where can you learn more? Go to https://Alorum.DailyStrength. org and sign in to your Sodraft account. Enter Q131 in the [facility] box to learn more about \"Laparoscopic Hysterectomy: What to Expect at Home. \"     If you do not have an account, please click on the \"Sign Up Now\" link. Current as of: November 22, 2021               Content Version: 13.3  © 2006-2022 Healthwise, Incorporated. Care instructions adapted under license by [facility] [employer] [facility] ([facility]). If you have questions about a medical condition or this instruction, always ask your healthcare professional. [place] [address] any warranty or liability for your use of this information.

## 2022-06-30 NOTE — PROGRESS NOTES
Pt states she has been having irregular periods and she had one on 6/2 and her LMP was 6/27 and she is currently on it. She has went through 2 boxes of super tampons. Pt would like to discuss possibly having a hysterectomy. Pt states she doesn't want an ablation, because she is sure she will have to have a hysterectomy eventually. Pt states she is ready for a permanent fix.

## 2022-06-30 NOTE — PROGRESS NOTES
Bibiana Campbell is a 29 y.o.  who presents today for a surgical discussion. Pt states she had her last child 11 months ago and had a bilateral salpingectomy with the c section. Pt states she has bleeding twice monthly with heavy periods and large clots. She is anemic, and has low energy. She has tried OCP with no relief. Pt states she has pain before and during her period, states she has to call off work due to pain. Also states she cannot have intercourse with her  due to pain. She wishes to have a partial hysterectomy. She wonders if she has endometriosis     Review of Systems   Constitutional: Negative. HENT: Negative. Eyes: Negative. Respiratory: Negative. Cardiovascular: Negative. Gastrointestinal: Negative. Genitourinary: Positive for dyspareunia, menstrual problem and pelvic pain. Negative for dysuria, frequency, urgency and vaginal discharge. Skin: Negative. Neurological: Negative. Psychiatric/Behavioral: Negative. No past medical history on file.     Past Surgical History:   Procedure Laterality Date    ANTERIOR CRUCIATE LIGAMENT REPAIR      2006     SECTION      x's 2    CHOLECYSTECTOMY      COLONOSCOPY  2014    [facility]    GALLBLADDER SURGERY      May 2013    TUBAL LIGATION      UPPER GASTROINTESTINAL ENDOSCOPY  2013           Family History   Problem Relation Age of Onset    Ulcerative Colitis Neg Hx     Uterine Cancer Neg Hx     Stomach Cancer Neg Hx     Rectal Cancer Neg Hx     Prostate Cancer Neg Hx     Liver Cancer Neg Hx     Irritable Bowel Syndrome Neg Hx     Inflam Bowel Dis Neg Hx     Esophageal Cancer Neg Hx     Crohn's Disease Neg Hx     Colon Polyps Neg Hx     Colon Cancer Neg Hx        Social History     Socioeconomic History    Marital status:      Spouse name: Not on file    Number of children: Not on file    Years of education: Not on file    Highest education level: Not on file   Occupational History Not on file   Tobacco Use    Smoking status: Never Smoker    Smokeless tobacco: Never Used   Substance and Sexual Activity    Alcohol use: No    Drug use: Yes    Sexual activity: Yes   Other Topics Concern    Not on file   Social History Narrative    Not on file     Social Determinants of Health     Financial Resource Strain:     Difficulty of Paying Living Expenses: Not on file   Food Insecurity:     Worried About [address] in the Last Year: Not on file    Ran Out of Food in the Last Year: Not on file   Transportation Needs:     Lack of Transportation (Medical): Not on file    Lack of Transportation (Non-Medical): Not on file   Physical Activity:     Days of Exercise per Week: Not on file    Minutes of Exercise per Session: Not on file   Stress:     Feeling of Stress : Not on file   Social Connections:     Frequency of Communication with Friends and Family: Not on file    Frequency of Social Gatherings with Friends and Family: Not on file    Attends Hoahaoism Services: Not on file    Active Member of [address] [place] [address] or Organizations: Not on file    Attends Club or Organization Meetings: Not on file    Marital Status: Not on file   Intimate Partner Violence:     Fear of Current or Ex-Partner: Not on file    Emotionally Abused: Not on file    Physically Abused: Not on file    Sexually Abused: Not on file   Housing Stability:     Unable to Pay for Housing in the Last Year: Not on file    Number of [place] in the Last Year: Not on file    Unstable Housing in the Last Year: Not on file         Current Outpatient Medications:     norgestimate-ethinyl estradiol (ORTHO-CYCLEN, 28,) 0.25-35 MG-MCG per tablet, Take 1 tablet by mouth daily (Patient not taking: Reported on 6/30/2022), Disp: 1 packet, Rfl: 6    No Known Allergies    /84   Ht 5' 3\" (1.6 m)   Wt 170 lb (77.1 kg)   LMP 06/27/2022   BMI 30.11 kg/m²   Physical Exam  Constitutional:       General: She is not in acute distress.      Appearance: She is well-developed. She is not diaphoretic. HENT:      Head: Normocephalic and atraumatic. Hair is normal.      Right Ear: Hearing and external ear normal. No decreased hearing noted. Left Ear: Hearing and external ear normal. No decreased hearing noted. Nose: Nose normal. No rhinorrhea. Mouth/Throat:      Dentition: Normal dentition. Eyes:      General:         Right eye: No discharge. Left eye: No discharge. Conjunctiva/sclera: Conjunctivae normal.      Pupils: Pupils are equal, round, and reactive to light. Pulmonary:      Effort: Pulmonary effort is normal. No respiratory distress. Musculoskeletal:         General: No deformity. Normal range of motion. Cervical back: Normal range of motion. Skin:     General: Skin is warm and dry. Coloration: Skin is not pale. Findings: No rash. Neurological:      Mental Status: She is alert and oriented to person, place, and time. Cranial Nerves: No cranial nerve deficit. Psychiatric:         Speech: Speech normal.         Behavior: Behavior normal.         Thought Content: Thought content normal.         Judgment: Judgment normal.        Latest Reference Range & Units 3/7/22 14:49 6/17/22 13:40   WBC 4.8 - 10.8 K/uL 9.3 9.8   RBC 4.20 - 5.40 M/uL 3.98 (L) 4.36   Hemoglobin Quant 12.0 - 16.0 g/dL 9.8 (L) 10.6 (L)   Hematocrit 37.0 - 47.0 % 32.0 (L) 34.6 (L)   MCV 81.0 - 99.0 fL 80.4 (L) 79.4 (L)   MCH 27.0 - 31.0 pg 24.6 (L) 24.3 (L)   MCHC 33.0 - 37.0 g/dL 30.6 (L) 30.6 (L)   MPV 9.4 - 12.3 fL 10.6 11.4   RDW 11.5 - 14.5 % 15.3 (H) 14.8 (H)   Platelet Count 156 - 400 K/uL 296 305     Narrative   Examination. US NON OB TRANSVAGINAL 3/15/2022 2:34 PM   History: Irregular menstrual cycles. The transvaginal sonography of the pelvis is performed. There is no   previous study for comparison. The uterus measures 6.1 x 3.5 x 3.9 cm. No intrinsic abnormality or a   mass. The endometrium measures 8 mm in thickness.    Normal ovaries bilaterally is seen. Normal blood flow. Impression   1. Unremarkable transvaginal sonography of the pelvis. Signed by Dr Nathalia Espinosa             Assessment   Diagnosis Orders   1. Menorrhagia with irregular cycle     2. Iron deficiency anemia due to chronic blood loss     3. Irregular menses     4. Pelvic pain     5. Dyspareunia in female     6. Pelvic pain in female         Plan     1. Discussed an ablation vs hysterectomy. Pt wishes to have a hysterectomy, she desires definitive management. Risks of TLH including bleeding, infection, injury to bowel/bladder/ureters and need for future surgery. Patient states understanding. All questions answered. Consent signed. 2. TLH with Carl November scheduled for 8-9-22. Shaheen Raza, am scribing for and in the presence of Dr. Jeet Davis. I, Dr. Jeet Davis, personally performed the services described in this documentation as scribed by Mattie Albert in my presence, and it is both accurate and complete.

## 2022-07-29 ASSESSMENT — ENCOUNTER SYMPTOMS
EYES NEGATIVE: 1
GASTROINTESTINAL NEGATIVE: 1
RESPIRATORY NEGATIVE: 1

## 2022-07-29 NOTE — PROGRESS NOTES
Ruben Harris is a 29 y.o. who presents today for her pre op visit for a TLH with Lord Dewitt, bilateral salpingectomy, cystoscopy on 22 for menorrhagia, iron deficiency anemia, dyspareunia, pelvic pain. Review of Systems   Constitutional: Negative. HENT: Negative. Eyes: Negative. Respiratory: Negative. Cardiovascular: Negative. Gastrointestinal: Negative. Genitourinary:  Positive for dyspareunia, menstrual problem and pelvic pain. Negative for dysuria, frequency, urgency and vaginal discharge. Skin: Negative. Neurological: Negative. Psychiatric/Behavioral: Negative. History reviewed. No pertinent past medical history.     Past Surgical History:   Procedure Laterality Date    ANTERIOR CRUCIATE LIGAMENT REPAIR      2006     SECTION      x's 2    CHOLECYSTECTOMY      COLONOSCOPY  2014    [facility]    GALLBLADDER SURGERY      May 2013    TUBAL LIGATION      UPPER GASTROINTESTINAL ENDOSCOPY  2013           Family History   Problem Relation Age of Onset    Ulcerative Colitis Neg Hx     Uterine Cancer Neg Hx     Stomach Cancer Neg Hx     Rectal Cancer Neg Hx     Prostate Cancer Neg Hx     Liver Cancer Neg Hx     Irritable Bowel Syndrome Neg Hx     Inflam Bowel Dis Neg Hx     Esophageal Cancer Neg Hx     Crohn's Disease Neg Hx     Colon Polyps Neg Hx     Colon Cancer Neg Hx        Social History     Socioeconomic History    Marital status:      Spouse name: Not on file    Number of children: Not on file    Years of education: Not on file    Highest education level: Not on file   Occupational History    Not on file   Tobacco Use    Smoking status: Never    Smokeless tobacco: Never   Substance and Sexual Activity    Alcohol use: No    Drug use: Yes    Sexual activity: Yes   Other Topics Concern    Not on file   Social History Narrative    Not on file     Social Determinants of Health     Financial Resource Strain: Not on file   Food Insecurity: Not on file   Transportation Needs: Not on file   Physical Activity: Not on file   Stress: Not on file   Social Connections: Not on file   Intimate Partner Violence: Not on file   Housing Stability: Not on file       No current outpatient medications on file. No Known Allergies    /72   Ht 5' 3\" (1.6 m)   Wt 169 lb (76.7 kg)   LMP 07/28/2022   BMI 29.94 kg/m²   Physical Exam  Constitutional:       General: She is not in acute distress. Appearance: She is well-developed. She is not diaphoretic. HENT:      Head: Normocephalic and atraumatic. Hair is normal.      Right Ear: Hearing and external ear normal. No decreased hearing noted. Left Ear: Hearing and external ear normal. No decreased hearing noted. Nose: Nose normal. No rhinorrhea. Mouth/Throat:      Dentition: Normal dentition. Eyes:      General:         Right eye: No discharge. Left eye: No discharge. Conjunctiva/sclera: Conjunctivae normal.      Pupils: Pupils are equal, round, and reactive to light. Pulmonary:      Effort: Pulmonary effort is normal. No respiratory distress. Musculoskeletal:         General: No deformity. Normal range of motion. Cervical back: Normal range of motion. Skin:     General: Skin is warm and dry. Coloration: Skin is not pale. Findings: No rash. Neurological:      Mental Status: She is alert and oriented to person, place, and time. Cranial Nerves: No cranial nerve deficit. Psychiatric:         Speech: Speech normal.         Behavior: Behavior normal.         Thought Content:  Thought content normal.         Judgment: Judgment normal.      Latest Reference Range & Units 3/7/22 14:49 6/17/22 13:40   WBC 4.8 - 10.8 K/uL 9.3 9.8   RBC 4.20 - 5.40 M/uL 3.98 (L) 4.36   Hemoglobin Quant 12.0 - 16.0 g/dL 9.8 (L) 10.6 (L)   Hematocrit 37.0 - 47.0 % 32.0 (L) 34.6 (L)   MCV 81.0 - 99.0 fL 80.4 (L) 79.4 (L)   MCH 27.0 - 31.0 pg 24.6 (L) 24.3 (L)   MCHC 33.0 - 37.0 g/dL 30.6 (L) 30.6 (L)   MPV 9.4 - 12.3 fL 10.6 11.4   RDW 11.5 - 14.5 % 15.3 (H) 14.8 (H)   Platelet Count 646 - 400 K/uL 296 305       Narrative   Examination. US NON OB TRANSVAGINAL 3/15/2022 2:34 PM   History: Irregular menstrual cycles. The transvaginal sonography of the pelvis is performed. There is no   previous study for comparison. The uterus measures 6.1 x 3.5 x 3.9 cm. No intrinsic abnormality or a   mass. The endometrium measures 8 mm in thickness. Normal ovaries bilaterally is seen. Normal blood flow. Impression   1. Unremarkable transvaginal sonography of the pelvis. Signed by Dr Lyric Vines           Assessment   Diagnosis Orders   1. Menorrhagia with irregular cycle        2. Iron deficiency anemia due to chronic blood loss        3. Irregular menses        4. Pelvic pain        5. Dyspareunia in female            Plan     Consent signed for Total Laparoscopic Hysterectomy, bilateral salpingectomy, cystoscopy. Procedure and risks explained. Risks include bleeding, infection, injury to bowels, bladder and ureters. All questions answered. RTC 2 weeks post op  I Florian Adam, am scribing for and in the presence of Dr. Jaqueline Turner. I, Dr. Jaqueline Turner, personally performed the services described in this documentation as scribed by Florian Adam in my presence, and it is both accurate and complete.

## 2022-08-01 ENCOUNTER — OFFICE VISIT (OUTPATIENT)
Dept: OBGYN CLINIC | Age: 32
End: 2022-08-01
Payer: OTHER GOVERNMENT

## 2022-08-01 ENCOUNTER — HOSPITAL ENCOUNTER (OUTPATIENT)
Dept: PREADMISSION TESTING | Age: 32
Discharge: HOME | End: 2022-08-05
Payer: OTHER GOVERNMENT

## 2022-08-01 VITALS — WEIGHT: 164 LBS | BODY MASS INDEX: 29.06 KG/M2 | HEIGHT: 63 IN

## 2022-08-01 VITALS
WEIGHT: 169 LBS | BODY MASS INDEX: 29.95 KG/M2 | SYSTOLIC BLOOD PRESSURE: 120 MMHG | DIASTOLIC BLOOD PRESSURE: 72 MMHG | HEIGHT: 63 IN

## 2022-08-01 DIAGNOSIS — D50.0 IRON DEFICIENCY ANEMIA DUE TO CHRONIC BLOOD LOSS: ICD-10-CM

## 2022-08-01 DIAGNOSIS — R10.2 PELVIC PAIN: ICD-10-CM

## 2022-08-01 DIAGNOSIS — N92.1 MENORRHAGIA WITH IRREGULAR CYCLE: Primary | ICD-10-CM

## 2022-08-01 DIAGNOSIS — N94.10 DYSPAREUNIA IN FEMALE: ICD-10-CM

## 2022-08-01 DIAGNOSIS — N92.6 IRREGULAR MENSES: ICD-10-CM

## 2022-08-01 LAB
ABO/RH: NORMAL
ANTIBODY SCREEN: NORMAL

## 2022-08-01 PROCEDURE — 86901 BLOOD TYPING SEROLOGIC RH(D): CPT

## 2022-08-01 PROCEDURE — 99214 OFFICE O/P EST MOD 30 MIN: CPT | Performed by: OBSTETRICS & GYNECOLOGY

## 2022-08-01 PROCEDURE — 86900 BLOOD TYPING SEROLOGIC ABO: CPT

## 2022-08-01 PROCEDURE — 86850 RBC ANTIBODY SCREEN: CPT

## 2022-08-01 NOTE — PROGRESS NOTES
Kathi Lo presents today for pre-operative visit of  ROBOTIC TOTAL LAPAROSCOPIC HYSTERECTOMY, CYSTOSCOPY, & EUA. Surgery is scheduled for 2022. History and Physical was performed. Informed consent discussed and she was counseled about the risks of bleeding, infection and damage to organs. She is scheduled for surgery and notified of pre-op arrangements. She is a [id number].

## 2022-08-01 NOTE — DISCHARGE INSTRUCTIONS
The day before your surgery, you will receive a phone call from the surgery nurse, to let you know what time to arrive on the day of surgery. This call will usually be between 2-4 PM.  If you do not receive a phone call by 4 PM the day before your surgery, please call [phone] and let them know you have not received an arrival time. If your surgery is on Monday, your call will be on the Friday before your Monday surgery. The morning of surgery, you may take all your prescribed medications with a sip of water. Any exceptions to this would be listed below:       [facility] not eat or drink anything after midnight, the night before your surgery. This is extremely important for your safety. Take a bath (or shower) the night before your surgery and you may brush your teeth the morning of your surgery. You will be scheduled to arrive at the hospital 2 hours before your surgery, or follow your surgeon's instructions. Dress comfortably. Wear loose clothing that will be easy to remove and comfortable for your trip home. You may wear eyeglasses or contacts but bring your cases with you as they must be remove before your surgery. Hearing aids and dentures will need to be removed before your surgery. Do not wear any jewelry, including body jewelry. All jewelry will need to be removed prior to your surgery. Do not wear fingernail polish or make-up. It is best not to bring any valuables with you. If you are to stay in the hospital overnight, bring your robe, slippers and personal toiletries that you may need. POSTOPERATIVE GUIDELINES AFTER RECEIVING ANESTHESIA    If you are to go home after your surgery, you will need a responsible adult to drive you home. You will not be able to take public transportation after your discharge from the Operative Care Unit unless you are accompanied by a        responsible adult.     On returning home, be sure to follow your physician's orders regarding diet, activity and medications. Remember, surgery with general anesthesia or sedation may leave you sleepy, very tired and with a decreased appetite for 12 to 24 hours. If you develop any post-surgical complications or problems, call your surgeon or [facility] Emergency Department ([phone]). [address] [place] [address] for Surgery Patients-Revised 6-    Visitors for surgery patients are essential for the patient's emotional well-being and care       post operatively. 2.   Visitor Expectations and Limitations        VISITORS MUST WEAR MASKS AT ALL TIMES. NO Cloth masks allowed. 3.  One visitor allowed with patients in the preop/postop rooms. 4.  A second visitor may sit in the waiting area. 5.  No children under 13 allowed in the pre-post op areas unless they are the patient. 6.  Two people may be with an underage surgical/procedural patient in preop/postop room.

## 2022-08-09 ENCOUNTER — ANESTHESIA EVENT (OUTPATIENT)
Dept: OPERATING ROOM | Age: 32
End: 2022-08-09
Payer: OTHER GOVERNMENT

## 2022-08-09 ENCOUNTER — HOSPITAL ENCOUNTER (OUTPATIENT)
Age: 32
Setting detail: OUTPATIENT SURGERY
Discharge: HOME | End: 2022-08-09
Attending: OBSTETRICS & GYNECOLOGY | Admitting: OBSTETRICS & GYNECOLOGY
Payer: OTHER GOVERNMENT

## 2022-08-09 ENCOUNTER — ANESTHESIA (OUTPATIENT)
Dept: OPERATING ROOM | Age: 32
End: 2022-08-09
Payer: OTHER GOVERNMENT

## 2022-08-09 VITALS
BODY MASS INDEX: 29.77 KG/M2 | RESPIRATION RATE: 16 BRPM | HEIGHT: 63 IN | WEIGHT: 168 LBS | HEART RATE: 84 BPM | SYSTOLIC BLOOD PRESSURE: 123 MMHG | TEMPERATURE: 97.7 F | DIASTOLIC BLOOD PRESSURE: 79 MMHG | OXYGEN SATURATION: 100 %

## 2022-08-09 DIAGNOSIS — Z90.710 S/P LAPAROSCOPIC HYSTERECTOMY: Primary | ICD-10-CM

## 2022-08-09 LAB
ABO/RH: NORMAL
ANTIBODY SCREEN: NORMAL
HCG(URINE) PREGNANCY TEST: NEGATIVE

## 2022-08-09 PROCEDURE — 2709999900 HC NON-CHARGEABLE SUPPLY: Performed by: OBSTETRICS & GYNECOLOGY

## 2022-08-09 PROCEDURE — 2580000003 HC RX 258: Performed by: OBSTETRICS & GYNECOLOGY

## 2022-08-09 PROCEDURE — 2580000003 HC RX 258: Performed by: ANESTHESIOLOGY

## 2022-08-09 PROCEDURE — 86900 BLOOD TYPING SEROLOGIC ABO: CPT

## 2022-08-09 PROCEDURE — 58571 TLH W/T/O 250 G OR LESS: CPT | Performed by: OBSTETRICS & GYNECOLOGY

## 2022-08-09 PROCEDURE — 88305 TISSUE EXAM BY PATHOLOGIST: CPT

## 2022-08-09 PROCEDURE — 2500000003 HC RX 250 WO HCPCS: Performed by: NURSE ANESTHETIST, CERTIFIED REGISTERED

## 2022-08-09 PROCEDURE — 7100000001 HC PACU RECOVERY - ADDTL 15 MIN: Performed by: OBSTETRICS & GYNECOLOGY

## 2022-08-09 PROCEDURE — 36415 COLL VENOUS BLD VENIPUNCTURE: CPT

## 2022-08-09 PROCEDURE — 7100000011 HC PHASE II RECOVERY - ADDTL 15 MIN: Performed by: OBSTETRICS & GYNECOLOGY

## 2022-08-09 PROCEDURE — 2500000003 HC RX 250 WO HCPCS: Performed by: OBSTETRICS & GYNECOLOGY

## 2022-08-09 PROCEDURE — 7100000010 HC PHASE II RECOVERY - FIRST 15 MIN: Performed by: OBSTETRICS & GYNECOLOGY

## 2022-08-09 PROCEDURE — 3600000019 HC SURGERY ROBOT ADDTL 15MIN: Performed by: OBSTETRICS & GYNECOLOGY

## 2022-08-09 PROCEDURE — 88307 TISSUE EXAM BY PATHOLOGIST: CPT

## 2022-08-09 PROCEDURE — 86901 BLOOD TYPING SEROLOGIC RH(D): CPT

## 2022-08-09 PROCEDURE — 3700000001 HC ADD 15 MINUTES (ANESTHESIA): Performed by: OBSTETRICS & GYNECOLOGY

## 2022-08-09 PROCEDURE — 3600000009 HC SURGERY ROBOT BASE: Performed by: OBSTETRICS & GYNECOLOGY

## 2022-08-09 PROCEDURE — 6360000002 HC RX W HCPCS: Performed by: OBSTETRICS & GYNECOLOGY

## 2022-08-09 PROCEDURE — 7100000000 HC PACU RECOVERY - FIRST 15 MIN: Performed by: OBSTETRICS & GYNECOLOGY

## 2022-08-09 PROCEDURE — 6360000002 HC RX W HCPCS: Performed by: ANESTHESIOLOGY

## 2022-08-09 PROCEDURE — 6370000000 HC RX 637 (ALT 250 FOR IP): Performed by: OBSTETRICS & GYNECOLOGY

## 2022-08-09 PROCEDURE — 3700000000 HC ANESTHESIA ATTENDED CARE: Performed by: OBSTETRICS & GYNECOLOGY

## 2022-08-09 PROCEDURE — A4217 STERILE WATER/SALINE, 500 ML: HCPCS | Performed by: OBSTETRICS & GYNECOLOGY

## 2022-08-09 PROCEDURE — C9290 INJ, BUPIVACAINE LIPOSOME: HCPCS | Performed by: OBSTETRICS & GYNECOLOGY

## 2022-08-09 PROCEDURE — 6370000000 HC RX 637 (ALT 250 FOR IP): Performed by: ANESTHESIOLOGY

## 2022-08-09 PROCEDURE — 2720000010 HC SURG SUPPLY STERILE: Performed by: OBSTETRICS & GYNECOLOGY

## 2022-08-09 PROCEDURE — 84703 CHORIONIC GONADOTROPIN ASSAY: CPT

## 2022-08-09 PROCEDURE — 86850 RBC ANTIBODY SCREEN: CPT

## 2022-08-09 PROCEDURE — S2900 ROBOTIC SURGICAL SYSTEM: HCPCS | Performed by: OBSTETRICS & GYNECOLOGY

## 2022-08-09 PROCEDURE — 6360000002 HC RX W HCPCS: Performed by: NURSE ANESTHETIST, CERTIFIED REGISTERED

## 2022-08-09 RX ORDER — MAGNESIUM HYDROXIDE 1200 MG/15ML
LIQUID ORAL CONTINUOUS PRN
Status: COMPLETED | OUTPATIENT
Start: 2022-08-09 | End: 2022-08-09

## 2022-08-09 RX ORDER — ONDANSETRON 2 MG/ML
INJECTION INTRAMUSCULAR; INTRAVENOUS PRN
Status: DISCONTINUED | OUTPATIENT
Start: 2022-08-09 | End: 2022-08-09 | Stop reason: SDUPTHER

## 2022-08-09 RX ORDER — ONDANSETRON 2 MG/ML
4 INJECTION INTRAMUSCULAR; INTRAVENOUS
Status: DISCONTINUED | OUTPATIENT
Start: 2022-08-09 | End: 2022-08-09 | Stop reason: HOSPADM

## 2022-08-09 RX ORDER — MIDAZOLAM HYDROCHLORIDE 2 MG/2ML
2 INJECTION, SOLUTION INTRAMUSCULAR; INTRAVENOUS
Status: DISCONTINUED | OUTPATIENT
Start: 2022-08-09 | End: 2022-08-09 | Stop reason: HOSPADM

## 2022-08-09 RX ORDER — SODIUM CHLORIDE 9 MG/ML
INJECTION, SOLUTION INTRAVENOUS PRN
Status: DISCONTINUED | OUTPATIENT
Start: 2022-08-09 | End: 2022-08-09 | Stop reason: HOSPADM

## 2022-08-09 RX ORDER — FENTANYL CITRATE 50 UG/ML
INJECTION, SOLUTION INTRAMUSCULAR; INTRAVENOUS PRN
Status: DISCONTINUED | OUTPATIENT
Start: 2022-08-09 | End: 2022-08-09 | Stop reason: SDUPTHER

## 2022-08-09 RX ORDER — SODIUM CHLORIDE 0.9 % (FLUSH) 0.9 %
5-40 SYRINGE (ML) INJECTION PRN
Status: DISCONTINUED | OUTPATIENT
Start: 2022-08-09 | End: 2022-08-09 | Stop reason: HOSPADM

## 2022-08-09 RX ORDER — IBUPROFEN 800 MG/1
800 TABLET ORAL EVERY 8 HOURS PRN
Qty: 90 TABLET | Refills: 0 | Status: SHIPPED | OUTPATIENT
Start: 2022-08-09

## 2022-08-09 RX ORDER — OXYCODONE HYDROCHLORIDE AND ACETAMINOPHEN 5; 325 MG/1; MG/1
1 TABLET ORAL EVERY 6 HOURS PRN
Qty: 12 TABLET | Refills: 0 | Status: SHIPPED | OUTPATIENT
Start: 2022-08-09 | End: 2022-08-12

## 2022-08-09 RX ORDER — MIDAZOLAM HYDROCHLORIDE 1 MG/ML
INJECTION INTRAMUSCULAR; INTRAVENOUS PRN
Status: DISCONTINUED | OUTPATIENT
Start: 2022-08-09 | End: 2022-08-09 | Stop reason: SDUPTHER

## 2022-08-09 RX ORDER — SODIUM CHLORIDE, SODIUM LACTATE, POTASSIUM CHLORIDE, CALCIUM CHLORIDE 600; 310; 30; 20 MG/100ML; MG/100ML; MG/100ML; MG/100ML
INJECTION, SOLUTION INTRAVENOUS CONTINUOUS
Status: DISCONTINUED | OUTPATIENT
Start: 2022-08-09 | End: 2022-08-09 | Stop reason: HOSPADM

## 2022-08-09 RX ORDER — ROCURONIUM BROMIDE 10 MG/ML
INJECTION, SOLUTION INTRAVENOUS PRN
Status: DISCONTINUED | OUTPATIENT
Start: 2022-08-09 | End: 2022-08-09 | Stop reason: SDUPTHER

## 2022-08-09 RX ORDER — FENTANYL CITRATE 50 UG/ML
25 INJECTION, SOLUTION INTRAMUSCULAR; INTRAVENOUS EVERY 5 MIN PRN
Status: DISCONTINUED | OUTPATIENT
Start: 2022-08-09 | End: 2022-08-09 | Stop reason: HOSPADM

## 2022-08-09 RX ORDER — PHENAZOPYRIDINE HYDROCHLORIDE 100 MG/1
100 TABLET, FILM COATED ORAL ONCE
Status: COMPLETED | OUTPATIENT
Start: 2022-08-09 | End: 2022-08-09

## 2022-08-09 RX ORDER — SODIUM CHLORIDE 0.9 % (FLUSH) 0.9 %
5-40 SYRINGE (ML) INJECTION EVERY 12 HOURS SCHEDULED
Status: DISCONTINUED | OUTPATIENT
Start: 2022-08-09 | End: 2022-08-09 | Stop reason: HOSPADM

## 2022-08-09 RX ORDER — MEPERIDINE HYDROCHLORIDE 25 MG/ML
12.5 INJECTION INTRAMUSCULAR; INTRAVENOUS; SUBCUTANEOUS EVERY 5 MIN PRN
Status: DISCONTINUED | OUTPATIENT
Start: 2022-08-09 | End: 2022-08-09 | Stop reason: HOSPADM

## 2022-08-09 RX ORDER — APREPITANT 40 MG/1
40 CAPSULE ORAL ONCE
Status: COMPLETED | OUTPATIENT
Start: 2022-08-09 | End: 2022-08-09

## 2022-08-09 RX ORDER — DEXAMETHASONE SODIUM PHOSPHATE 10 MG/ML
INJECTION, SOLUTION INTRAMUSCULAR; INTRAVENOUS PRN
Status: DISCONTINUED | OUTPATIENT
Start: 2022-08-09 | End: 2022-08-09 | Stop reason: SDUPTHER

## 2022-08-09 RX ORDER — SCOLOPAMINE TRANSDERMAL SYSTEM 1 MG/1
1 PATCH, EXTENDED RELEASE TRANSDERMAL
Status: DISCONTINUED | OUTPATIENT
Start: 2022-08-09 | End: 2022-08-09 | Stop reason: HOSPADM

## 2022-08-09 RX ORDER — BUPIVACAINE HYDROCHLORIDE AND EPINEPHRINE 2.5; 5 MG/ML; UG/ML
INJECTION, SOLUTION INFILTRATION; PERINEURAL PRN
Status: DISCONTINUED | OUTPATIENT
Start: 2022-08-09 | End: 2022-08-09 | Stop reason: ALTCHOICE

## 2022-08-09 RX ORDER — DIPHENHYDRAMINE HYDROCHLORIDE 50 MG/ML
12.5 INJECTION INTRAMUSCULAR; INTRAVENOUS
Status: DISCONTINUED | OUTPATIENT
Start: 2022-08-09 | End: 2022-08-09 | Stop reason: HOSPADM

## 2022-08-09 RX ORDER — LIDOCAINE HYDROCHLORIDE 10 MG/ML
1 INJECTION, SOLUTION EPIDURAL; INFILTRATION; INTRACAUDAL; PERINEURAL
Status: DISCONTINUED | OUTPATIENT
Start: 2022-08-09 | End: 2022-08-09 | Stop reason: HOSPADM

## 2022-08-09 RX ORDER — FENTANYL CITRATE 50 UG/ML
50 INJECTION, SOLUTION INTRAMUSCULAR; INTRAVENOUS EVERY 5 MIN PRN
Status: DISCONTINUED | OUTPATIENT
Start: 2022-08-09 | End: 2022-08-09 | Stop reason: HOSPADM

## 2022-08-09 RX ORDER — KETOROLAC TROMETHAMINE 30 MG/ML
INJECTION, SOLUTION INTRAMUSCULAR; INTRAVENOUS PRN
Status: DISCONTINUED | OUTPATIENT
Start: 2022-08-09 | End: 2022-08-09 | Stop reason: SDUPTHER

## 2022-08-09 RX ORDER — FAMOTIDINE 20 MG/1
20 TABLET, FILM COATED ORAL ONCE
Status: DISCONTINUED | OUTPATIENT
Start: 2022-08-09 | End: 2022-08-09 | Stop reason: HOSPADM

## 2022-08-09 RX ORDER — LIDOCAINE HYDROCHLORIDE 10 MG/ML
INJECTION, SOLUTION EPIDURAL; INFILTRATION; INTRACAUDAL; PERINEURAL PRN
Status: DISCONTINUED | OUTPATIENT
Start: 2022-08-09 | End: 2022-08-09 | Stop reason: SDUPTHER

## 2022-08-09 RX ORDER — FENTANYL CITRATE 50 UG/ML
25 INJECTION, SOLUTION INTRAMUSCULAR; INTRAVENOUS
Status: DISCONTINUED | OUTPATIENT
Start: 2022-08-09 | End: 2022-08-09 | Stop reason: HOSPADM

## 2022-08-09 RX ORDER — PROCHLORPERAZINE EDISYLATE 5 MG/ML
5 INJECTION INTRAMUSCULAR; INTRAVENOUS
Status: DISCONTINUED | OUTPATIENT
Start: 2022-08-09 | End: 2022-08-09 | Stop reason: HOSPADM

## 2022-08-09 RX ORDER — SODIUM CHLORIDE 9 MG/ML
INJECTION, SOLUTION INTRAVENOUS CONTINUOUS
Status: DISCONTINUED | OUTPATIENT
Start: 2022-08-09 | End: 2022-08-09 | Stop reason: HOSPADM

## 2022-08-09 RX ORDER — PROPOFOL 10 MG/ML
INJECTION, EMULSION INTRAVENOUS PRN
Status: DISCONTINUED | OUTPATIENT
Start: 2022-08-09 | End: 2022-08-09 | Stop reason: SDUPTHER

## 2022-08-09 RX ORDER — OXYCODONE HYDROCHLORIDE AND ACETAMINOPHEN 5; 325 MG/1; MG/1
1 TABLET ORAL
Status: COMPLETED | OUTPATIENT
Start: 2022-08-09 | End: 2022-08-09

## 2022-08-09 RX ORDER — FENTANYL CITRATE 50 UG/ML
50 INJECTION, SOLUTION INTRAMUSCULAR; INTRAVENOUS
Status: DISCONTINUED | OUTPATIENT
Start: 2022-08-09 | End: 2022-08-09 | Stop reason: HOSPADM

## 2022-08-09 RX ORDER — DEXMEDETOMIDINE HYDROCHLORIDE 100 UG/ML
INJECTION, SOLUTION INTRAVENOUS PRN
Status: DISCONTINUED | OUTPATIENT
Start: 2022-08-09 | End: 2022-08-09 | Stop reason: SDUPTHER

## 2022-08-09 RX ADMIN — SUGAMMADEX 200 MG: 100 INJECTION, SOLUTION INTRAVENOUS at 11:39

## 2022-08-09 RX ADMIN — FENTANYL CITRATE 100 MCG: 50 INJECTION, SOLUTION INTRAMUSCULAR; INTRAVENOUS at 10:14

## 2022-08-09 RX ADMIN — MIDAZOLAM 2 MG: 1 INJECTION INTRAMUSCULAR; INTRAVENOUS at 10:11

## 2022-08-09 RX ADMIN — DEXAMETHASONE SODIUM PHOSPHATE 10 MG: 10 INJECTION, SOLUTION INTRAMUSCULAR; INTRAVENOUS at 10:32

## 2022-08-09 RX ADMIN — SODIUM CHLORIDE, SODIUM LACTATE, POTASSIUM CHLORIDE, AND CALCIUM CHLORIDE: 600; 310; 30; 20 INJECTION, SOLUTION INTRAVENOUS at 11:29

## 2022-08-09 RX ADMIN — CEFAZOLIN 2000 MG: 2 INJECTION, POWDER, FOR SOLUTION INTRAMUSCULAR; INTRAVENOUS at 10:30

## 2022-08-09 RX ADMIN — LIDOCAINE HYDROCHLORIDE 50 MG: 10 INJECTION, SOLUTION EPIDURAL; INFILTRATION; INTRACAUDAL; PERINEURAL at 10:16

## 2022-08-09 RX ADMIN — ONDANSETRON 4 MG: 2 INJECTION INTRAMUSCULAR; INTRAVENOUS at 11:30

## 2022-08-09 RX ADMIN — PHENAZOPYRIDINE HYDROCHLORIDE 100 MG: 100 TABLET ORAL at 08:35

## 2022-08-09 RX ADMIN — APREPITANT 40 MG: 40 CAPSULE ORAL at 08:35

## 2022-08-09 RX ADMIN — KETOROLAC TROMETHAMINE 30 MG: 30 INJECTION, SOLUTION INTRAMUSCULAR; INTRAVENOUS at 11:49

## 2022-08-09 RX ADMIN — SODIUM CHLORIDE, SODIUM LACTATE, POTASSIUM CHLORIDE, AND CALCIUM CHLORIDE: 600; 310; 30; 20 INJECTION, SOLUTION INTRAVENOUS at 08:34

## 2022-08-09 RX ADMIN — FENTANYL CITRATE 50 MCG: 50 INJECTION, SOLUTION INTRAMUSCULAR; INTRAVENOUS at 12:26

## 2022-08-09 RX ADMIN — PROPOFOL 150 MG: 10 INJECTION, EMULSION INTRAVENOUS at 10:16

## 2022-08-09 RX ADMIN — ROCURONIUM BROMIDE 70 MG: 10 INJECTION, SOLUTION INTRAVENOUS at 10:16

## 2022-08-09 RX ADMIN — OXYCODONE HYDROCHLORIDE AND ACETAMINOPHEN 1 TABLET: 5; 325 TABLET ORAL at 13:24

## 2022-08-09 RX ADMIN — FENTANYL CITRATE 50 MCG: 50 INJECTION, SOLUTION INTRAMUSCULAR; INTRAVENOUS at 12:20

## 2022-08-09 RX ADMIN — DEXMEDETOMIDINE HYDROCHLORIDE 10 MCG: 100 INJECTION, SOLUTION, CONCENTRATE INTRAVENOUS at 10:27

## 2022-08-09 ASSESSMENT — PAIN DESCRIPTION - LOCATION
LOCATION: ABDOMEN

## 2022-08-09 ASSESSMENT — PAIN DESCRIPTION - DESCRIPTORS
DESCRIPTORS: CRAMPING

## 2022-08-09 ASSESSMENT — PAIN SCALES - GENERAL
PAINLEVEL_OUTOF10: 7

## 2022-08-09 ASSESSMENT — PAIN DESCRIPTION - FREQUENCY
FREQUENCY: CONTINUOUS
FREQUENCY: CONTINUOUS

## 2022-08-09 ASSESSMENT — ENCOUNTER SYMPTOMS
RESPIRATORY NEGATIVE: 1
GASTROINTESTINAL NEGATIVE: 1
ALLERGIC/IMMUNOLOGIC NEGATIVE: 1
EYES NEGATIVE: 1

## 2022-08-09 ASSESSMENT — PAIN DESCRIPTION - PAIN TYPE
TYPE: SURGICAL PAIN
TYPE: SURGICAL PAIN

## 2022-08-09 ASSESSMENT — PAIN - FUNCTIONAL ASSESSMENT
PAIN_FUNCTIONAL_ASSESSMENT: PREVENTS OR INTERFERES SOME ACTIVE ACTIVITIES AND ADLS
PAIN_FUNCTIONAL_ASSESSMENT: PREVENTS OR INTERFERES SOME ACTIVE ACTIVITIES AND ADLS

## 2022-08-09 NOTE — H&P
ACACIA Chris is a 29 y.o. female with h/o chronic pelvic pain, dysmenorrhea and anemia secondary to menorrhagia who presents for hysterectomy. Review of Systems   Constitutional: Negative. HENT: Negative. Eyes: Negative. Respiratory: Negative. Cardiovascular: Negative. Gastrointestinal: Negative. Endocrine: Negative. Genitourinary: Negative. Musculoskeletal: Negative. Skin: Negative. Allergic/Immunologic: Negative. Neurological: Negative. Hematological: Negative. Psychiatric/Behavioral: Negative. History reviewed. No pertinent past medical history. Past Surgical History:   Procedure Laterality Date    ANTERIOR CRUCIATE LIGAMENT REPAIR      2006    APPENDECTOMY       SECTION      x's 2    CHOLECYSTECTOMY      COLONOSCOPY  2014    [facility]    GALLBLADDER SURGERY      May 2013    TUBAL LIGATION      UPPER GASTROINTESTINAL ENDOSCOPY  2013         Family History   Problem Relation Age of Onset    Ulcerative Colitis Neg Hx     Uterine Cancer Neg Hx     Stomach Cancer Neg Hx     Rectal Cancer Neg Hx     Prostate Cancer Neg Hx     Liver Cancer Neg Hx     Irritable Bowel Syndrome Neg Hx     Inflam Bowel Dis Neg Hx     Esophageal Cancer Neg Hx     Crohn's Disease Neg Hx     Colon Polyps Neg Hx     Colon Cancer Neg Hx      Social History     Tobacco Use    Smoking status: Never    Smokeless tobacco: Never   Vaping Use    Vaping Use: Never used   Substance Use Topics    Alcohol use: No    Drug use: Never     No current facility-administered medications on file prior to encounter. No current outpatient medications on file prior to encounter. No Known Allergies  /85   Pulse 100   Temp 98 °F (36.7 °C) (Temporal)   Resp 18   Ht 5' 3\" (1.6 m)   Wt 168 lb (76.2 kg)   LMP 2022   SpO2 100%   BMI 29.76 kg/m²   Physical Exam  Constitutional:       General: She is not in acute distress. Appearance: Normal appearance. She is not ill-appearing or diaphoretic. HENT:      Head: Normocephalic and atraumatic. Eyes:      General: No scleral icterus. Right eye: No discharge. Left eye: No discharge. Extraocular Movements: Extraocular movements intact. Pulmonary:      Effort: Pulmonary effort is normal. No respiratory distress. Musculoskeletal:         General: Normal range of motion. Skin:     Coloration: Skin is not pale. Findings: No erythema or rash. Neurological:      Mental Status: She is alert and oriented to person, place, and time. Psychiatric:         Attention and Perception: Attention and perception normal.         Mood and Affect: Mood and affect normal.         Speech: Speech normal.         Behavior: Behavior normal. Behavior is cooperative. Thought Content: Thought content normal.         Cognition and Memory: Cognition and memory normal.         Judgment: Judgment normal.     Narrative   Examination. US NON OB TRANSVAGINAL 3/15/2022 2:34 PM   History: Irregular menstrual cycles. The transvaginal sonography of the pelvis is performed. There is no   previous study for comparison. The uterus measures 6.1 x 3.5 x 3.9 cm. No intrinsic abnormality or a   mass. The endometrium measures 8 mm in thickness. Normal ovaries bilaterally is seen. Normal blood flow. Impression   1. Unremarkable transvaginal sonography of the pelvis. Signed by Dr Nkechi Denton   Menorrhagia  Chronic pelvic pain  Iron deficiency anemia    Plan  To OR for TLH, cystoscopy. Risks of TLH including bleeding, infection, injury to bowel/bladder/ureters and need for future surgery. Patient states understanding. All questions answered. Consent signed.

## 2022-08-09 NOTE — OP NOTE
PREOPERATIVE DIAGNOSES   Menometrorrhagia  Chronic pelvic pain  Iron deficiency anemia  Dysmenorrhea  Failed medical management     POSTOPERATIVE DIAGNOSES  Same     PROCEDURES PERFORMED  1. Examination under anesthesia. 2.  Total laparoscopic hysterectomy. 3.  Cystoscopy. ANESTHESIA  General.     STAFF    First Assistant: Yogesh Ramírez RN; Jenn Portillo      ESTIMATED BLOOD LOSS  50 mL     IVF  100 mL    UOP    298 mL    COMPLICATIONS  None. SPECIMENS  Uterus with cervix (100 grams). Posterior cul-de-sac nodule        FINDINGS  Omental adhesions to the anterior abdominal wall. Small ventral wall hernia. Evidence of prior salpingectomy with small ampullar remnants on each ovary. Small, white nodule in the posterior cul-de-sac on the right. Minimal uterine enlargement. Small area of vesicouterine adhesions on the left aspect of the uterus    DESCRIPTION OF PROCEDURE   The patient was taken to the operating room where she was placed under general anesthesia. She was positioned in dorsal lithotomy, prepped and draped in the usual sterile fashion. A weighted speculum was then placed into the vagina, and the anterior lip of the cervix was grasped with a single-tooth tenaculum. The cervix was serially dilated to accommodate a medium V-Care Uterine Manipulator, which was placed without complication. Attention was then turned to the abdomen where a supraumbilical incision was made. Veress needle was placed. Placement was confirmed with saline drop testand aspiration of air. Pneumoperitoneum was created. Two 8-mm ports were placed, 10 cm on each side of the umbilicus. An 8-mm assist port was placed in the LLQ. Bilateral uterine ovarian ligaments were grasped, cauterized,and cut using the Vessel sealer. Next round ligaments were serially ligated bilaterally. Entrance was made into the anterior leaf of the broad ligament from each side to the midline and bladder flap was created.   Bladder was displaced inferiorly. The above described nodule was freed from the peritoneum in the cul-de-sac and removed. Bilateral uterine arteries were skeletonized,grasped, cauterized and cut using the Vessel sealer. Colpotomy was made robotically beginning anteriorly and extended circumferentially about the manipulator until the uterus was amputated. Uterus was removed. Vaginal cuff was repaired robotically with 0-Stratafix in a running fashion. The pelvis was copiously irrigated removing all clots and debris. Left ovarian cysts were drained of a small amount of clear fluid. Given the streak nature of the ovary and its proximity to the IP ligament. No futher dissection was carried out. Vistaseal was placed over the cuff and pedicles with excellent hemostasis noted. Cystoscopy was performed with bilateral ureteral flux of urine noted. At this time, the procedure was concluded. The pneumoperitoneum was deflated. All incisions were repaired with 4-0 monocryl. and Dermabond. At this time, the procedure was concluded. The patient was awakened in the operating room, taken to recovery in stable condition.

## 2022-08-09 NOTE — ANESTHESIA PRE PROCEDURE
Department of Anesthesiology  Preprocedure Note       Name:  Mega Leung   Age:  29 y.o.  :  1990                                          MRN:  [id number]         Date:  2022      Surgeon: Veronica Mariano):  Gaylyn Paget, MD    Procedure: Procedure(s):  ROBOTIC TOTAL LAPAROSCOPIC HYSTERECTOMY, CYSTOSCOPY, & EUA    Medications prior to admission:   Prior to Admission medications    Not on File       Current medications:    Current Facility-Administered Medications   Medication Dose Route Frequency Provider Last Rate Last Admin    ceFAZolin (ANCEF) 2,000 mg in sterile water 20 mL IV syringe  2,000 mg IntraVENous Once Gaylyn Paget, MD        phenazopyridine (PYRIDIUM) tablet 100 mg  100 mg Oral Once Gaylyn Paget, MD        lactated ringers infusion   IntraVENous Continuous Mundo Calixto MD           Allergies:  No Known Allergies    Problem List:    Patient Active Problem List   Diagnosis Code    RUQ abdominal pain R10.11    Nausea & vomiting R11.2    Diarrhea R19.7    S/P cholecystectomy Z90.49    Abnormal CT of the abdomen R93.5    Irritable bowel syndrome with diarrhea K58.0       Past Medical History:  History reviewed. No pertinent past medical history. Past Surgical History:        Procedure Laterality Date    ANTERIOR CRUCIATE LIGAMENT REPAIR      2006     SECTION      x's 2    CHOLECYSTECTOMY      [facility]OSCOPY  2014    [facility]    GALLBLADDER SURGERY      May 2013    TUBAL LIGATION      UPPER GASTROINTESTINAL ENDOSCOPY  2013           Social History:    Social History     Tobacco Use    Smoking status: Never    Smokeless tobacco: Never   Substance Use Topics    Alcohol use: No                                Counseling given: Not Answered      Vital Signs (Current): There were no vitals filed for this visit.                                            BP Readings from Last 3 Encounters:   22 120/72   22 130/84 06/17/22 118/79       NPO Status:                                                                                 BMI:   Wt Readings from Last 3 Encounters:   08/01/22 164 lb (74.4 kg)   08/01/22 169 lb (76.7 kg)   06/30/22 170 lb (77.1 kg)     There is no height or weight on file to calculate BMI.    CBC:   Lab Results   Component Value Date/Time    WBC 9.8 06/17/2022 01:40 PM    RBC 4.36 06/17/2022 01:40 PM    HGB 10.6 06/17/2022 01:40 PM    HCT 34.6 06/17/2022 01:40 PM    MCV 79.4 06/17/2022 01:40 PM    RDW 14.8 06/17/2022 01:40 PM     06/17/2022 01:40 PM       CMP:   Lab Results   Component Value Date/Time     05/03/2014 06:00 PM    K 3.9 05/03/2014 06:00 PM     05/03/2014 06:00 PM    CO2 22 05/03/2014 06:00 PM    BUN 14 05/03/2014 06:00 PM    CREATININE 0.7 05/03/2014 06:00 PM    LABGLOM 112 05/03/2014 06:00 PM    GLUCOSE 88 05/03/2014 06:00 PM    PROT 8.4 05/03/2014 06:00 PM    CALCIUM 10.4 05/03/2014 06:00 PM    ALKPHOS 73 05/03/2014 06:00 PM    AST 14 05/03/2014 06:00 PM    ALT 10 05/03/2014 06:00 PM       POC Tests: No results for input(s): POCGLU, POCNA, POCK, POCCL, POCBUN, POCHEMO, POCHCT in the last 72 hours.     Coags: No results found for: PROTIME, INR, APTT    HCG (If Applicable):   Lab Results   Component Value Date    PREGTESTUR NEGATIVE (L) 05/08/2014        ABGs: No results found for: PHART, PO2ART, SRV6GST, IFH2QUV, BEART, C1UDOGWI     Type & Screen (If Applicable):  No results found for: LABABO, LABRH    Drug/Infectious Status (If Applicable):  No results found for: HIV, HEPCAB    COVID-19 Screening (If Applicable): No results found for: COVID19        Anesthesia Evaluation  Patient summary reviewed and Nursing notes reviewed no history of anesthetic complications:   Airway: Mallampati: I  TM distance: >3 FB   Neck ROM: full  Mouth opening: > = 3 FB   Dental: normal exam         Pulmonary:Negative Pulmonary ROS                              Cardiovascular:Negative CV ROS  Exercise tolerance: good (>4 METS),            Beta Blocker:  Not on Beta Blocker         Neuro/Psych:   Negative Neuro/Psych ROS              GI/Hepatic/Renal: Neg GI/Hepatic/Renal ROS            Endo/Other:    (+) blood dyscrasia: anemia:., .                  ROS comment: Heavy and irregular menses Abdominal:             Vascular: negative vascular ROS. Other Findings:           Anesthesia Plan      general     ASA 1     (Scop,emend, and pepcid in preop.)  Induction: intravenous. MIPS: Postoperative opioids intended and Prophylactic antiemetics administered. Anesthetic plan and risks discussed with patient. Use of blood products discussed with patient whom consented to blood products.                      Mary Morales DO   8/9/2022

## 2022-08-09 NOTE — DISCHARGE INSTRUCTIONS
Nothing in the vagina for 8 weeks. No sex, no tampons, no bath, no douche. No heavy lifting or straining. Vaginal bleeding may occur while healing. If like a period or heavier, you are doing too much. Rest with your feet elevated for next 48 hours and it should taper off. Call MD with fever > 101, foul smelling vaginal discharge or any other questions or concerns.

## 2022-08-09 NOTE — ANESTHESIA POSTPROCEDURE EVALUATION
Department of Anesthesiology  Postprocedure Note    Patient: Any Burnett  MRN: [id number]  YOB: 1990  Date of evaluation: 8/9/2022      Procedure Summary     Date: 08/09/22 Room / Location: [address]    Anesthesia Start: 1011 Anesthesia Stop: 5787    Procedure: ROBOTIC TOTAL LAPAROSCOPIC HYSTERECTOMY, CYSTOSCOPY, & EUA Diagnosis:       Menorrhagia with irregular cycle      (Menorrhagia with irregular cycle [N92.1])    Surgeons: Stacie Dubon MD Responsible Provider: FRANCK Santizo CRNA    Anesthesia Type: general ASA Status: 1          Anesthesia Type: No value filed.     [place] Phase I:      Carolyn Phase II:        Anesthesia Post Evaluation    Patient location during evaluation: PACU  Patient participation: complete - patient participated  Level of consciousness: sleepy but conscious  Pain score: 0  Airway patency: patent  Nausea & Vomiting: no nausea and no vomiting  Complications: no  Cardiovascular status: hemodynamically stable  Respiratory status: acceptable  Hydration status: euvolemic

## 2022-08-22 ENCOUNTER — OFFICE VISIT (OUTPATIENT)
Dept: OBGYN CLINIC | Age: 32
End: 2022-08-22

## 2022-08-22 VITALS
WEIGHT: 167 LBS | DIASTOLIC BLOOD PRESSURE: 68 MMHG | HEIGHT: 63 IN | SYSTOLIC BLOOD PRESSURE: 106 MMHG | BODY MASS INDEX: 29.59 KG/M2

## 2022-08-22 DIAGNOSIS — Z48.89 POSTOPERATIVE VISIT: Primary | ICD-10-CM

## 2022-08-22 DIAGNOSIS — Z90.710 S/P LAPAROSCOPIC HYSTERECTOMY: ICD-10-CM

## 2022-08-22 PROCEDURE — 99024 POSTOP FOLLOW-UP VISIT: CPT | Performed by: OBSTETRICS & GYNECOLOGY

## 2022-08-22 ASSESSMENT — ENCOUNTER SYMPTOMS
GASTROINTESTINAL NEGATIVE: 1
RESPIRATORY NEGATIVE: 1
EYES NEGATIVE: 1

## 2022-08-22 NOTE — PROGRESS NOTES
Postoperative Follow-up: Patient presents for 2 week follow-up post ROBOTIC TOTAL LAPAROSCOPIC HYSTERECTOMY, CYSTOSCOPY, & EUA for MENORRHAGIA . Eating a regular diet without difficulty. Bowel movements are Abnormal  Pt states \"she is slowly getting there\" Pt states she was constipated right after surgery but things are gradually getting better. .  Pain is controlled with current analgesics. Medication(s) being used: ibuprofen (OTC). Pt states close to her far right incision she feels \"pulling\" and that is the only pain she is having since her surgery.

## 2022-08-22 NOTE — PROGRESS NOTES
Gracia Chavez is a 29 y.o. who presents today for her 2 week post op visit following a TLH with Thressa Pound on 22. Doing Well. No complaints    Review of Systems   Constitutional: Negative. HENT: Negative. Eyes: Negative. Respiratory: Negative. Cardiovascular: Negative. Gastrointestinal: Negative. Genitourinary: Negative. Negative for dysuria, frequency, menstrual problem, pelvic pain, urgency and vaginal discharge. Skin: Negative. Neurological: Negative. Psychiatric/Behavioral: Negative. History reviewed. No pertinent past medical history.     Past Surgical History:   Procedure Laterality Date    ANTERIOR CRUCIATE LIGAMENT REPAIR      2006    APPENDECTOMY       SECTION      x's 2    CHOLECYSTECTOMY      COLONOSCOPY  2014    [facility]    GALLBLADDER SURGERY      May 2013    HYSTERECTOMY (CERVIX STATUS UNKNOWN) N/A 2022    ROBOTIC TOTAL LAPAROSCOPIC HYSTERECTOMY, CYSTOSCOPY, & EUA performed by Phillip Urbina MD at [address] [place] [address] ENDOSCOPY  2013           Family History   Problem Relation Age of Onset    Ulcerative Colitis Neg Hx     Uterine Cancer Neg Hx     Stomach Cancer Neg Hx     Rectal Cancer Neg Hx     Prostate Cancer Neg Hx     Liver Cancer Neg Hx     Irritable Bowel Syndrome Neg Hx     Inflam Bowel Dis Neg Hx     Esophageal Cancer Neg Hx     Crohn's Disease Neg Hx     Colon Polyps Neg Hx     Colon Cancer Neg Hx        Social History     Socioeconomic History    Marital status:      Spouse name: Not on file    Number of children: Not on file    Years of education: Not on file    Highest education level: Not on file   Occupational History    Not on file   Tobacco Use    Smoking status: Never    Smokeless tobacco: Never   Vaping Use    Vaping Use: Never used   Substance and Sexual Activity    Alcohol use: No    Drug use: Never    Sexual activity: Yes   Other Topics Concern    Not on file Social History Narrative    Not on file     Social Determinants of Health     Financial Resource Strain: Not on file   Food Insecurity: Not on file   Transportation Needs: Not on file   Physical Activity: Not on file   Stress: Not on file   Social Connections: Not on file   Intimate Partner Violence: Not on file   Housing Stability: Not on file         Current Outpatient Medications:     ibuprofen (ADVIL;MOTRIN) 800 MG tablet, Take 1 tablet by mouth every 8 hours as needed for Pain, Disp: 90 tablet, Rfl: 0    No Known Allergies    /68   Ht 5' 3\" (1.6 m)   Wt 167 lb (75.8 kg)   LMP 08/09/2022   BMI 29.58 kg/m²   Physical Exam  Constitutional:       General: She is not in acute distress. Appearance: She is well-developed. She is not diaphoretic. HENT:      Head: Normocephalic and atraumatic. Hair is normal.      Right Ear: Hearing and external ear normal. No decreased hearing noted. Left Ear: Hearing and external ear normal. No decreased hearing noted. Nose: Nose normal. No rhinorrhea. Mouth/Throat:      Dentition: Normal dentition. Eyes:      General:         Right eye: No discharge. Left eye: No discharge. Conjunctiva/sclera: Conjunctivae normal.      Pupils: Pupils are equal, round, and reactive to light. Pulmonary:      Effort: Pulmonary effort is normal. No respiratory distress. Musculoskeletal:         General: No deformity. Normal range of motion. Cervical back: Normal range of motion. Skin:     General: Skin is warm and dry. Coloration: Skin is not pale. Findings: No rash. Neurological:      Mental Status: She is alert and oriented to person, place, and time. Cranial Nerves: No cranial nerve deficit. Psychiatric:         Speech: Speech normal.         Behavior: Behavior normal.         Thought Content: Thought content normal.         Judgment: Judgment normal.             Assessment   Diagnosis Orders   1.  Postoperative visit 2. S/P laparoscopic hysterectomy            Plan     Released to drive, lifting restrictions >10lbs remain until 6 weeks post op, no intercourse until 8 weeks post op. RTC 4 weeks  Surgical findings discussed: Darrion Delacruz, am scribing for and in the presence of Dr. Kellee Leigh. I, Dr. Kellee Leigh, personally performed the services described in this documentation as scribed by James Wylie in my presence, and it is both accurate and complete.

## 2022-09-22 ENCOUNTER — OFFICE VISIT (OUTPATIENT)
Dept: OBGYN CLINIC | Age: 32
End: 2022-09-22

## 2022-09-22 VITALS
DIASTOLIC BLOOD PRESSURE: 64 MMHG | BODY MASS INDEX: 30.65 KG/M2 | WEIGHT: 173 LBS | HEIGHT: 63 IN | SYSTOLIC BLOOD PRESSURE: 108 MMHG

## 2022-09-22 DIAGNOSIS — Z48.89 POSTOPERATIVE VISIT: Primary | ICD-10-CM

## 2022-09-22 DIAGNOSIS — Z90.710 S/P LAPAROSCOPIC HYSTERECTOMY: ICD-10-CM

## 2022-09-22 PROCEDURE — 99024 POSTOP FOLLOW-UP VISIT: CPT | Performed by: OBSTETRICS & GYNECOLOGY

## 2022-09-22 ASSESSMENT — ENCOUNTER SYMPTOMS
RESPIRATORY NEGATIVE: 1
GASTROINTESTINAL NEGATIVE: 1
EYES NEGATIVE: 1

## 2022-09-22 NOTE — PROGRESS NOTES
Postoperative Follow-up: Patient presents for 2 week follow-up post ROBOTIC TOTAL LAPAROSCOPIC HYSTERECTOMY, CYSTOSCOPY, & EUA for MENORRHAGIA . Eating a regular diet without difficulty. Bowel movements are Abnormal  Pt states \"she is slowly getting there\" Pt states she was constipated right after surgery but things are gradually getting better. .  Patient isn't having any pain.

## 2022-09-22 NOTE — PROGRESS NOTES
Sonia Hernandez is a 29 y.o.  who presents today for her 6 week post op visit following a TLH with Daniel Elliott, bilateral salpingectomy on 22. She is doing well no complaints. Review of Systems   Constitutional: Negative. HENT: Negative. Eyes: Negative. Respiratory: Negative. Cardiovascular: Negative. Gastrointestinal: Negative. Genitourinary: Negative. Negative for dysuria, frequency, menstrual problem, pelvic pain, urgency and vaginal discharge. Skin: Negative. Neurological: Negative. Psychiatric/Behavioral: Negative. History reviewed. No pertinent past medical history.     Past Surgical History:   Procedure Laterality Date    ANTERIOR CRUCIATE LIGAMENT REPAIR      2006    APPENDECTOMY       SECTION      x's 2    CHOLECYSTECTOMY      COLONOSCOPY  2014    [facility]    GALLBLADDER SURGERY      May 2013    HYSTERECTOMY (CERVIX STATUS UNKNOWN) N/A 2022    ROBOTIC TOTAL LAPAROSCOPIC HYSTERECTOMY, CYSTOSCOPY, & EUA performed by Angela Pierce MD at [address] [place] [address] ENDOSCOPY  2013           Family History   Problem Relation Age of Onset    Ulcerative Colitis Neg Hx     Uterine Cancer Neg Hx     Stomach Cancer Neg Hx     Rectal Cancer Neg Hx     Prostate Cancer Neg Hx     Liver Cancer Neg Hx     Irritable Bowel Syndrome Neg Hx     Inflam Bowel Dis Neg Hx     Esophageal Cancer Neg Hx     Crohn's Disease Neg Hx     Colon Polyps Neg Hx     Colon Cancer Neg Hx        Social History     Socioeconomic History    Marital status:      Spouse name: Not on file    Number of children: Not on file    Years of education: Not on file    Highest education level: Not on file   Occupational History    Not on file   Tobacco Use    Smoking status: Never    Smokeless tobacco: Never   Vaping Use    Vaping Use: Never used   Substance and Sexual Activity    Alcohol use: No    Drug use: Never    Sexual activity: Yes   Other Topics Concern    Not on file   Social History Narrative    Not on file     Social Determinants of Health     Financial Resource Strain: Not on file   Food Insecurity: Not on file   Transportation Needs: Not on file   Physical Activity: Not on file   Stress: Not on file   Social Connections: Not on file   Intimate Partner Violence: Not on file   Housing Stability: Not on file         Current Outpatient Medications:     ibuprofen (ADVIL;MOTRIN) 800 MG tablet, Take 1 tablet by mouth every 8 hours as needed for Pain (Patient not taking: Reported on 9/22/2022), Disp: 90 tablet, Rfl: 0    No Known Allergies    /64   Ht 5' 3\" (1.6 m)   Wt 173 lb (78.5 kg)   LMP 08/09/2022   BMI 30.65 kg/m²   Physical Exam  Constitutional:       General: She is not in acute distress. Appearance: She is well-developed. HENT:      Head: Normocephalic and atraumatic. Eyes:      Conjunctiva/sclera: Conjunctivae normal.      Pupils: Pupils are equal, round, and reactive to light. Pulmonary:      Effort: Pulmonary effort is normal. No respiratory distress. Abdominal:      General: There is no distension. Palpations: Abdomen is soft. Tenderness: There is no abdominal tenderness. Genitourinary:     Labia:         Right: No rash or lesion. Left: No rash or lesion. Vagina: Normal.      Rectum: Normal.      Comments: Vaginal cuff intact, well-healed, NT  Musculoskeletal:         General: No tenderness. Normal range of motion. Cervical back: Normal range of motion and neck supple. Skin:     General: Skin is warm and dry. Neurological:      Mental Status: She is alert and oriented to person, place, and time. Psychiatric:         Behavior: Behavior normal.         Thought Content: Thought content normal.         Judgment: Judgment normal.             Assessment   Diagnosis Orders   1. Postoperative visit        2.  S/P laparoscopic hysterectomy            Plan     May resume ADL's, no intercourse until 8 weeks post op  RTC one year or prn  I Harley Noyola, am scribing for and in the presence of Dr. Kingsley Maloney. I, Dr. Kingsley Maloney, personally performed the services described in this documentation as scribed by Harley Noyola in my presence, and it is both accurate and complete.

## 2023-07-17 SDOH — HEALTH STABILITY: PHYSICAL HEALTH: ON AVERAGE, HOW MANY DAYS PER WEEK DO YOU ENGAGE IN MODERATE TO STRENUOUS EXERCISE (LIKE A BRISK WALK)?: 3 DAYS

## 2023-07-17 SDOH — HEALTH STABILITY: PHYSICAL HEALTH: ON AVERAGE, HOW MANY MINUTES DO YOU ENGAGE IN EXERCISE AT THIS LEVEL?: 20 MIN

## 2023-07-18 ENCOUNTER — OFFICE VISIT (OUTPATIENT)
Dept: PRIMARY CARE CLINIC | Age: 33
End: 2023-07-18
Payer: OTHER GOVERNMENT

## 2023-07-18 VITALS
DIASTOLIC BLOOD PRESSURE: 74 MMHG | BODY MASS INDEX: 32.96 KG/M2 | OXYGEN SATURATION: 99 % | HEIGHT: 63 IN | HEART RATE: 93 BPM | WEIGHT: 186 LBS | SYSTOLIC BLOOD PRESSURE: 116 MMHG | TEMPERATURE: 97.9 F

## 2023-07-18 DIAGNOSIS — Z00.00 ENCOUNTER FOR MEDICAL EXAMINATION TO ESTABLISH CARE: Primary | ICD-10-CM

## 2023-07-18 DIAGNOSIS — Z11.4 SCREENING FOR HIV WITHOUT PRESENCE OF RISK FACTORS: ICD-10-CM

## 2023-07-18 DIAGNOSIS — R53.83 OTHER FATIGUE: ICD-10-CM

## 2023-07-18 DIAGNOSIS — Z11.59 ENCOUNTER FOR HEPATITIS C SCREENING TEST FOR LOW RISK PATIENT: ICD-10-CM

## 2023-07-18 DIAGNOSIS — Z13.220 SCREENING FOR HYPERLIPIDEMIA: ICD-10-CM

## 2023-07-18 PROCEDURE — 99385 PREV VISIT NEW AGE 18-39: CPT | Performed by: NURSE PRACTITIONER

## 2023-07-18 SDOH — ECONOMIC STABILITY: FOOD INSECURITY: WITHIN THE PAST 12 MONTHS, THE FOOD YOU BOUGHT JUST DIDN'T LAST AND YOU DIDN'T HAVE MONEY TO GET MORE.: NEVER TRUE

## 2023-07-18 SDOH — ECONOMIC STABILITY: HOUSING INSECURITY
IN THE LAST 12 MONTHS, WAS THERE A TIME WHEN YOU DID NOT HAVE A STEADY PLACE TO SLEEP OR SLEPT IN A SHELTER (INCLUDING NOW)?: NO

## 2023-07-18 SDOH — ECONOMIC STABILITY: INCOME INSECURITY: HOW HARD IS IT FOR YOU TO PAY FOR THE VERY BASICS LIKE FOOD, HOUSING, MEDICAL CARE, AND HEATING?: NOT HARD AT ALL

## 2023-07-18 SDOH — ECONOMIC STABILITY: FOOD INSECURITY: WITHIN THE PAST 12 MONTHS, YOU WORRIED THAT YOUR FOOD WOULD RUN OUT BEFORE YOU GOT MONEY TO BUY MORE.: NEVER TRUE

## 2023-07-18 ASSESSMENT — ENCOUNTER SYMPTOMS
VOMITING: 0
ABDOMINAL PAIN: 0
CHEST TIGHTNESS: 0
COLOR CHANGE: 0
DIARRHEA: 0
SHORTNESS OF BREATH: 0
NAUSEA: 0
SORE THROAT: 0
COUGH: 0

## 2023-07-18 ASSESSMENT — PATIENT HEALTH QUESTIONNAIRE - PHQ9
SUM OF ALL RESPONSES TO PHQ9 QUESTIONS 1 & 2: 0
SUM OF ALL RESPONSES TO PHQ QUESTIONS 1-9: 0
SUM OF ALL RESPONSES TO PHQ QUESTIONS 1-9: 0
1. LITTLE INTEREST OR PLEASURE IN DOING THINGS: 0
SUM OF ALL RESPONSES TO PHQ QUESTIONS 1-9: 0
2. FEELING DOWN, DEPRESSED OR HOPELESS: 0
SUM OF ALL RESPONSES TO PHQ QUESTIONS 1-9: 0

## 2023-07-18 NOTE — PROGRESS NOTES
2023     Adrienne Aguilera (:  1990) is a 34 y.o. female,New patient, here for evaluation of the following chief complaint(s):  Establish Care and Annual Exam (Work physical )      ASSESSMENT/PLAN:  1. Encounter for medical examination to establish care  Assessment & Plan:   Patient here today for the purpose of establishing care. She denies any concerns on today's exam, and reports a partial hysterectomy in 2022. She follows with gynecology and states she has a follow up with them in September for her annual pap. Patient is starting a new job as a  at [employer]- physical paperwork completed on today's exam.  Orders:  -     CBC with Auto Differential; Future  -     Comprehensive Metabolic Panel; Future  2. Screening for hyperlipidemia  -     Lipid Panel; Future  3. Other fatigue  -     TSH with Reflex to FT4; Future  -     Vitamin D 25 Hydroxy; Future  4. Encounter for hepatitis C screening test for low risk patient  -     Hepatitis C Antibody; Future  5. Screening for HIV without presence of risk factors  -     HIV Rapid 1&2; Future      Return in about 1 year (around 2024). SUBJECTIVE/OBJECTIVE:  HPI    Prior to Visit Medications    Medication Sig Taking? Authorizing Provider   ibuprofen (ADVIL;MOTRIN) 800 MG tablet Take 1 tablet by mouth every 8 hours as needed for Pain  Patient not taking: Reported on 2022  Roberta Jolly MD       Review of Systems   Constitutional:  Negative for activity change and fever. HENT:  Negative for congestion, ear pain and sore throat. Respiratory:  Negative for cough, chest tightness and shortness of breath. Cardiovascular:  Negative for chest pain. Gastrointestinal:  Negative for abdominal pain, diarrhea, nausea and vomiting. Genitourinary:  Negative for frequency and urgency. Musculoskeletal:  Negative for arthralgias and myalgias. Skin:  Negative for color change.    Neurological:  Negative for

## 2023-07-18 NOTE — ASSESSMENT & PLAN NOTE
Patient here today for the purpose of establishing care. She denies any concerns on today's exam, and reports a partial hysterectomy in September 2022. She follows with gynecology and states she has a follow up with them in September for her annual pap.  Patient is starting a new job as a  at [employer]- physical paperwork completed on today's exam.

## 2023-08-17 PROBLEM — Z00.00 ENCOUNTER FOR MEDICAL EXAMINATION TO ESTABLISH CARE: Status: RESOLVED | Noted: 2023-07-18 | Resolved: 2023-08-17

## 2023-11-20 ASSESSMENT — PATIENT HEALTH QUESTIONNAIRE - PHQ9
SUM OF ALL RESPONSES TO PHQ QUESTIONS 1-9: 0
2. FEELING DOWN, DEPRESSED OR HOPELESS: 0
1. LITTLE INTEREST OR PLEASURE IN DOING THINGS: 0
SUM OF ALL RESPONSES TO PHQ QUESTIONS 1-9: 0
2. FEELING DOWN, DEPRESSED OR HOPELESS: NOT AT ALL
SUM OF ALL RESPONSES TO PHQ QUESTIONS 1-9: 0
1. LITTLE INTEREST OR PLEASURE IN DOING THINGS: NOT AT ALL
SUM OF ALL RESPONSES TO PHQ9 QUESTIONS 1 & 2: 0
SUM OF ALL RESPONSES TO PHQ QUESTIONS 1-9: 0
SUM OF ALL RESPONSES TO PHQ9 QUESTIONS 1 & 2: 0

## 2023-11-21 ENCOUNTER — OFFICE VISIT (OUTPATIENT)
Dept: OBGYN CLINIC | Age: 33
End: 2023-11-21
Payer: OTHER GOVERNMENT

## 2023-11-21 VITALS
BODY MASS INDEX: 33.3 KG/M2 | DIASTOLIC BLOOD PRESSURE: 81 MMHG | HEART RATE: 88 BPM | WEIGHT: 188 LBS | SYSTOLIC BLOOD PRESSURE: 119 MMHG

## 2023-11-21 DIAGNOSIS — Z12.72 SCREENING FOR VAGINAL CANCER: ICD-10-CM

## 2023-11-21 DIAGNOSIS — Z01.419 WELL WOMAN EXAM WITH ROUTINE GYNECOLOGICAL EXAM: Primary | ICD-10-CM

## 2023-11-21 DIAGNOSIS — Z12.39 ENCOUNTER FOR SCREENING BREAST EXAMINATION: ICD-10-CM

## 2023-11-21 PROCEDURE — 99395 PREV VISIT EST AGE 18-39: CPT | Performed by: NURSE PRACTITIONER

## 2023-11-21 ASSESSMENT — ENCOUNTER SYMPTOMS
CONSTIPATION: 0
RESPIRATORY NEGATIVE: 1
ALLERGIC/IMMUNOLOGIC NEGATIVE: 1
EYES NEGATIVE: 1
DIARRHEA: 0
GASTROINTESTINAL NEGATIVE: 1

## 2023-11-21 NOTE — PROGRESS NOTES
Pt presents today for pap smear and breast exam.    Mammo:NA   Pap smear:2022-per pt   Contraception:hyst  G:3  P:2  Ab:1  Bone density:NA   Colonoscopy:NA

## 2024-05-22 ENCOUNTER — OFFICE VISIT (OUTPATIENT)
Dept: PRIMARY CARE CLINIC | Age: 34
End: 2024-05-22
Payer: OTHER GOVERNMENT

## 2024-05-22 VITALS
WEIGHT: 191.8 LBS | HEIGHT: 63 IN | BODY MASS INDEX: 33.98 KG/M2 | DIASTOLIC BLOOD PRESSURE: 76 MMHG | SYSTOLIC BLOOD PRESSURE: 126 MMHG | HEART RATE: 91 BPM | OXYGEN SATURATION: 98 % | TEMPERATURE: 96.8 F

## 2024-05-22 DIAGNOSIS — R30.0 DYSURIA: ICD-10-CM

## 2024-05-22 DIAGNOSIS — N39.0 URINARY TRACT INFECTION WITH HEMATURIA, SITE UNSPECIFIED: Primary | ICD-10-CM

## 2024-05-22 DIAGNOSIS — R31.9 URINARY TRACT INFECTION WITH HEMATURIA, SITE UNSPECIFIED: Primary | ICD-10-CM

## 2024-05-22 DIAGNOSIS — R10.9 FLANK PAIN: ICD-10-CM

## 2024-05-22 LAB
APPEARANCE FLUID: ABNORMAL
BILIRUBIN, POC: ABNORMAL
BLOOD URINE, POC: ABNORMAL
CLARITY, POC: ABNORMAL
COLOR, POC: ABNORMAL
GLUCOSE URINE, POC: ABNORMAL
KETONES, POC: ABNORMAL
LEUKOCYTE EST, POC: ABNORMAL
NITRITE, POC: ABNORMAL
PH, POC: 5.5
PROTEIN, POC: 100
SPECIFIC GRAVITY, POC: 1.02
UROBILINOGEN, POC: 0.2

## 2024-05-22 PROCEDURE — 81002 URINALYSIS NONAUTO W/O SCOPE: CPT | Performed by: NURSE PRACTITIONER

## 2024-05-22 PROCEDURE — 99214 OFFICE O/P EST MOD 30 MIN: CPT | Performed by: NURSE PRACTITIONER

## 2024-05-22 RX ORDER — PHENAZOPYRIDINE HYDROCHLORIDE 200 MG/1
200 TABLET, FILM COATED ORAL 3 TIMES DAILY PRN
Qty: 9 TABLET | Refills: 0 | Status: SHIPPED | OUTPATIENT
Start: 2024-05-22 | End: 2024-05-25

## 2024-05-22 RX ORDER — KETOROLAC TROMETHAMINE 10 MG/1
10 TABLET, FILM COATED ORAL EVERY 6 HOURS PRN
Qty: 20 TABLET | Refills: 0 | Status: SHIPPED | OUTPATIENT
Start: 2024-05-22 | End: 2025-05-22

## 2024-05-22 RX ORDER — NITROFURANTOIN 25; 75 MG/1; MG/1
100 CAPSULE ORAL 2 TIMES DAILY
Qty: 20 CAPSULE | Refills: 0 | Status: SHIPPED | OUTPATIENT
Start: 2024-05-22 | End: 2024-06-01

## 2024-05-22 ASSESSMENT — PATIENT HEALTH QUESTIONNAIRE - PHQ9
SUM OF ALL RESPONSES TO PHQ9 QUESTIONS 1 & 2: 2
SUM OF ALL RESPONSES TO PHQ QUESTIONS 1-9: 2
1. LITTLE INTEREST OR PLEASURE IN DOING THINGS: SEVERAL DAYS
SUM OF ALL RESPONSES TO PHQ QUESTIONS 1-9: 2
2. FEELING DOWN, DEPRESSED OR HOPELESS: SEVERAL DAYS
SUM OF ALL RESPONSES TO PHQ9 QUESTIONS 1 & 2: 2
2. FEELING DOWN, DEPRESSED OR HOPELESS: SEVERAL DAYS
1. LITTLE INTEREST OR PLEASURE IN DOING THINGS: SEVERAL DAYS

## 2024-05-22 NOTE — PROGRESS NOTES
Ms.Jaci Pete is a 33 y.o. female who presents today for  Chief Complaint   Patient presents with    Fatigue    Weight Management    Dysuria       HPI:  Patient here today with concerns of increased fatigue and lack of energy.  She denies any recent illness but states that it is difficult just to get through the day.  Is also concerned weight gain. Patient reports she is very diet concious and focuses on healthy, high protein and low carb meals.     Patient also most concerns of possible urinary tract infection that began 3 days ago. She reports associated flank pain and dysuria. She denies fever or chills. Patient has been taking over the counter AZO with little to no relief.      Review of Systems   Constitutional:  Positive for fatigue. Negative for activity change and fever.   HENT:  Negative for congestion, ear pain and sore throat.    Respiratory:  Negative for cough, chest tightness and shortness of breath.    Cardiovascular:  Negative for chest pain.   Gastrointestinal:  Negative for abdominal pain, diarrhea, nausea and vomiting.   Genitourinary:  Positive for dysuria, flank pain and frequency. Negative for urgency.   Musculoskeletal:  Negative for arthralgias and myalgias.   Skin:  Negative for color change.   Neurological:  Negative for dizziness, weakness and numbness.   Psychiatric/Behavioral:  Negative for agitation. The patient is not nervous/anxious.        History reviewed. No pertinent past medical history.    Current Outpatient Medications   Medication Sig Dispense Refill    nitrofurantoin, macrocrystal-monohydrate, (MACROBID) 100 MG capsule Take 1 capsule by mouth 2 times daily for 10 days 20 capsule 0    ketorolac (TORADOL) 10 MG tablet Take 1 tablet by mouth every 6 hours as needed for Pain 20 tablet 0    vitamin D (ERGOCALCIFEROL) 1.25 MG (88595 UT) CAPS capsule Take 1 capsule by mouth once a week 12 capsule 1     No current facility-administered medications for this visit.       No Known

## 2024-05-23 DIAGNOSIS — Z13.220 SCREENING FOR HYPERLIPIDEMIA: ICD-10-CM

## 2024-05-23 DIAGNOSIS — Z11.4 SCREENING FOR HIV WITHOUT PRESENCE OF RISK FACTORS: ICD-10-CM

## 2024-05-23 DIAGNOSIS — Z11.59 ENCOUNTER FOR HEPATITIS C SCREENING TEST FOR LOW RISK PATIENT: ICD-10-CM

## 2024-05-23 DIAGNOSIS — Z00.00 ENCOUNTER FOR MEDICAL EXAMINATION TO ESTABLISH CARE: ICD-10-CM

## 2024-05-23 DIAGNOSIS — R53.83 OTHER FATIGUE: ICD-10-CM

## 2024-05-23 LAB
25(OH)D3 SERPL-MCNC: 15.2 NG/ML
ALBUMIN SERPL-MCNC: 4.1 G/DL (ref 3.5–5.2)
ALP SERPL-CCNC: 70 U/L (ref 35–104)
ALT SERPL-CCNC: 14 U/L (ref 5–33)
ANION GAP SERPL CALCULATED.3IONS-SCNC: 12 MMOL/L (ref 7–19)
AST SERPL-CCNC: 13 U/L (ref 5–32)
BASOPHILS # BLD: 0.1 K/UL (ref 0–0.2)
BASOPHILS NFR BLD: 0.7 % (ref 0–1)
BILIRUB SERPL-MCNC: 0.4 MG/DL (ref 0.2–1.2)
BUN SERPL-MCNC: 15 MG/DL (ref 6–20)
CALCIUM SERPL-MCNC: 9.1 MG/DL (ref 8.6–10)
CHLORIDE SERPL-SCNC: 106 MMOL/L (ref 98–111)
CHOLEST SERPL-MCNC: 136 MG/DL (ref 160–199)
CO2 SERPL-SCNC: 23 MMOL/L (ref 22–29)
CREAT SERPL-MCNC: 0.6 MG/DL (ref 0.5–0.9)
EOSINOPHIL # BLD: 0.2 K/UL (ref 0–0.6)
EOSINOPHIL NFR BLD: 2.5 % (ref 0–5)
ERYTHROCYTE [DISTWIDTH] IN BLOOD BY AUTOMATED COUNT: 12.7 % (ref 11.5–14.5)
GLUCOSE SERPL-MCNC: 93 MG/DL (ref 74–109)
HCT VFR BLD AUTO: 37 % (ref 37–47)
HCV AB SERPL QL IA: NORMAL
HDLC SERPL-MCNC: 54 MG/DL (ref 65–121)
HGB BLD-MCNC: 12.2 G/DL (ref 12–16)
HIV-1 P24 AG: NORMAL
HIV1+2 AB SERPLBLD QL IA.RAPID: NORMAL
IMM GRANULOCYTES # BLD: 0 K/UL
LDLC SERPL CALC-MCNC: 67 MG/DL
LYMPHOCYTES # BLD: 2.4 K/UL (ref 1.1–4.5)
LYMPHOCYTES NFR BLD: 28.7 % (ref 20–40)
MCH RBC QN AUTO: 28.2 PG (ref 27–31)
MCHC RBC AUTO-ENTMCNC: 33 G/DL (ref 33–37)
MCV RBC AUTO: 85.6 FL (ref 81–99)
MONOCYTES # BLD: 0.6 K/UL (ref 0–0.9)
MONOCYTES NFR BLD: 6.9 % (ref 0–10)
NEUTROPHILS # BLD: 5.1 K/UL (ref 1.5–7.5)
NEUTS SEG NFR BLD: 61.1 % (ref 50–65)
PLATELET # BLD AUTO: 300 K/UL (ref 130–400)
PMV BLD AUTO: 10.5 FL (ref 9.4–12.3)
POTASSIUM SERPL-SCNC: 4.2 MMOL/L (ref 3.5–5)
PROT SERPL-MCNC: 7 G/DL (ref 6.6–8.7)
RBC # BLD AUTO: 4.32 M/UL (ref 4.2–5.4)
SODIUM SERPL-SCNC: 141 MMOL/L (ref 136–145)
TRIGL SERPL-MCNC: 75 MG/DL (ref 0–149)
TSH SERPL DL<=0.005 MIU/L-ACNC: 1.17 UIU/ML (ref 0.27–4.2)
WBC # BLD AUTO: 8.4 K/UL (ref 4.8–10.8)

## 2024-05-24 DIAGNOSIS — E55.9 VITAMIN D DEFICIENCY: Primary | ICD-10-CM

## 2024-05-24 LAB
BACTERIA UR CULT: ABNORMAL
BACTERIA UR CULT: ABNORMAL
ORGANISM: ABNORMAL

## 2024-05-24 RX ORDER — ERGOCALCIFEROL 1.25 MG/1
50000 CAPSULE ORAL WEEKLY
Qty: 12 CAPSULE | Refills: 1 | Status: SHIPPED | OUTPATIENT
Start: 2024-05-24

## 2024-05-28 ENCOUNTER — TELEPHONE (OUTPATIENT)
Dept: PRIMARY CARE CLINIC | Age: 34
End: 2024-05-28

## 2024-05-28 NOTE — TELEPHONE ENCOUNTER
----- Message from FRANCK Ramon CNP sent at 5/24/2024 11:22 AM CDT -----  Please notify patient that her vitamin D is very low. Need to start her on weekly Ergocalciferol

## 2024-05-29 NOTE — TELEPHONE ENCOUNTER
----- Message from FRANCK Wolf CNP sent at 6/17/2022  1:44 PM CDT -----  Pt desires partial hyst for heavy bleeding and anemia. Has tried and failed OCP. Does not desire IUD.  Thanks ML
Called patient, pt wishes to discuss a hysterectomy. She states she has severe pain, pain with intercourse, 2 periods per month, heavy periods. Appt made to discuss with Dr. Kan Sigala.
Veto Patel(Attending)

## 2024-05-30 ASSESSMENT — ENCOUNTER SYMPTOMS
SHORTNESS OF BREATH: 0
COLOR CHANGE: 0
COUGH: 0
VOMITING: 0
ABDOMINAL PAIN: 0
DIARRHEA: 0
SORE THROAT: 0
NAUSEA: 0
CHEST TIGHTNESS: 0

## 2024-06-05 ENCOUNTER — OFFICE VISIT (OUTPATIENT)
Dept: PRIMARY CARE CLINIC | Age: 34
End: 2024-06-05
Payer: OTHER GOVERNMENT

## 2024-06-05 VITALS
TEMPERATURE: 97.8 F | DIASTOLIC BLOOD PRESSURE: 84 MMHG | SYSTOLIC BLOOD PRESSURE: 130 MMHG | WEIGHT: 193 LBS | OXYGEN SATURATION: 97 % | BODY MASS INDEX: 34.2 KG/M2 | HEIGHT: 63 IN | HEART RATE: 85 BPM

## 2024-06-05 DIAGNOSIS — R53.83 OTHER FATIGUE: Primary | ICD-10-CM

## 2024-06-05 DIAGNOSIS — Z76.89 ENCOUNTER FOR WEIGHT MANAGEMENT: ICD-10-CM

## 2024-06-05 PROCEDURE — 99214 OFFICE O/P EST MOD 30 MIN: CPT | Performed by: NURSE PRACTITIONER

## 2024-06-05 NOTE — PROGRESS NOTES
Ms.Jaci Pete is a 34 y.o. female who presents today for  Chief Complaint   Patient presents with    Follow-up       HPI:  Patient here today for follow-up.  She states that her energy level is much improved since she was seen 2 weeks ago.  Patient also notes that urinary symptoms have resolved.  Discussed weight management and her frustration related to weight gain.  Patient plans to continue her current interventions with diet and exercise.  She will discuss with her  possible medications.    Review of Systems   Constitutional:  Negative for activity change and fever.   HENT:  Negative for congestion, ear pain and sore throat.    Respiratory:  Negative for cough, chest tightness and shortness of breath.    Cardiovascular:  Negative for chest pain.   Gastrointestinal:  Negative for abdominal pain, diarrhea, nausea and vomiting.   Genitourinary:  Negative for frequency and urgency.   Musculoskeletal:  Negative for arthralgias and myalgias.   Skin:  Negative for color change.   Neurological:  Negative for dizziness, weakness and numbness.   Psychiatric/Behavioral:  Negative for agitation. The patient is not nervous/anxious.        History reviewed. No pertinent past medical history.    Current Outpatient Medications   Medication Sig Dispense Refill    vitamin D (ERGOCALCIFEROL) 1.25 MG (78153 UT) CAPS capsule Take 1 capsule by mouth once a week 12 capsule 1    ketorolac (TORADOL) 10 MG tablet Take 1 tablet by mouth every 6 hours as needed for Pain 20 tablet 0     No current facility-administered medications for this visit.       No Known Allergies    Past Surgical History:   Procedure Laterality Date    ANTERIOR CRUCIATE LIGAMENT REPAIR      2006    APPENDECTOMY       SECTION      x's 2    CHOLECYSTECTOMY      COLONOSCOPY  2014    [facility]    GALLBLADDER SURGERY      May 2013    HYSTERECTOMY (CERVIX STATUS UNKNOWN) N/A 2022    ROBOTIC TOTAL LAPAROSCOPIC HYSTERECTOMY, CYSTOSCOPY, & EUA

## 2024-06-11 ASSESSMENT — ENCOUNTER SYMPTOMS
SORE THROAT: 0
DIARRHEA: 0
ABDOMINAL PAIN: 0
COLOR CHANGE: 0
VOMITING: 0
COUGH: 0
SHORTNESS OF BREATH: 0
NAUSEA: 0
CHEST TIGHTNESS: 0

## 2024-10-25 ENCOUNTER — TELEPHONE (OUTPATIENT)
Dept: OBGYN CLINIC | Age: 34
End: 2024-10-25

## 2024-10-25 NOTE — TELEPHONE ENCOUNTER
Pinky would like someone to call her to see if she could change her appt with Anne-Marie Stevens on 11-  at 2PM. She could come in earlier if possible.   Please call her at  [phone] anytime     Thank you

## 2024-12-17 ENCOUNTER — OFFICE VISIT (OUTPATIENT)
Dept: OBGYN CLINIC | Age: 34
End: 2024-12-17
Payer: OTHER GOVERNMENT

## 2024-12-17 VITALS
HEART RATE: 83 BPM | DIASTOLIC BLOOD PRESSURE: 96 MMHG | WEIGHT: 192 LBS | BODY MASS INDEX: 34.01 KG/M2 | SYSTOLIC BLOOD PRESSURE: 135 MMHG

## 2024-12-17 DIAGNOSIS — Z01.419 WELL WOMAN EXAM WITH ROUTINE GYNECOLOGICAL EXAM: Primary | ICD-10-CM

## 2024-12-17 DIAGNOSIS — R53.83 OTHER FATIGUE: ICD-10-CM

## 2024-12-17 DIAGNOSIS — Z12.39 ENCOUNTER FOR SCREENING BREAST EXAMINATION: ICD-10-CM

## 2024-12-17 DIAGNOSIS — R45.86 MOOD SWINGS: ICD-10-CM

## 2024-12-17 DIAGNOSIS — R23.2 HOT FLASHES: ICD-10-CM

## 2024-12-17 DIAGNOSIS — Z12.72 SCREENING FOR VAGINAL CANCER: ICD-10-CM

## 2024-12-17 LAB
25(OH)D3 SERPL-MCNC: 19.8 NG/ML
CORTIS SERPL-MCNC: 8 UG/DL
FSH SERPL-SCNC: 5.3 MIU/ML
HBA1C MFR BLD: 5.3 % (ref 4–5.6)
TESTOST SERPL-MCNC: 9.5 NG/DL (ref 8.4–48.1)
TSH SERPL DL<=0.005 MIU/L-ACNC: 1.01 UIU/ML (ref 0.27–4.2)
VIT B12 SERPL-MCNC: 322 PG/ML (ref 232–1245)

## 2024-12-17 PROCEDURE — 99213 OFFICE O/P EST LOW 20 MIN: CPT | Performed by: NURSE PRACTITIONER

## 2024-12-17 PROCEDURE — 99395 PREV VISIT EST AGE 18-39: CPT | Performed by: NURSE PRACTITIONER

## 2024-12-17 ASSESSMENT — ENCOUNTER SYMPTOMS
EYES NEGATIVE: 1
RESPIRATORY NEGATIVE: 1
CONSTIPATION: 0
ALLERGIC/IMMUNOLOGIC NEGATIVE: 1
DIARRHEA: 0
GASTROINTESTINAL NEGATIVE: 1

## 2024-12-17 NOTE — PROGRESS NOTES
Pt presents today for pap smear and breast exam. She is wanting to discuss her hormones has bad mood swings, fatigue, hot flashes, and feels off. She has been dieting and working out and can not lose weight \"feels gross\".     Mammo:NA  Pap smear:  Contraception:Hyst  G:3  P:2  Ab:1  Bone density:NA  Colonoscopy:2014  
Vagina: Normal.      Uterus: Absent.       Adnexa:         Right: No mass or tenderness.          Left: No mass or tenderness.        Comments: Cervix and uterus surgically absent  Vaginal cuff palpates smooth  Musculoskeletal:         General: Normal range of motion.      Cervical back: Normal range of motion and neck supple.   Skin:     General: Skin is warm and dry.   Neurological:      Mental Status: She is alert and oriented to person, place, and time.   Psychiatric:         Attention and Perception: Attention normal.         Mood and Affect: Mood normal.         Speech: Speech normal.         Behavior: Behavior normal.         Thought Content: Thought content normal.         Cognition and Memory: Cognition normal.         Judgment: Judgment normal.              Diagnosis Orders   1. Well woman exam with routine gynecological exam        2. Encounter for screening breast examination        3. Screening for vaginal cancer        4. Mood swings        5. Other fatigue  Follicle Stimulating Hormone    DHEA-Sulfate    Cortisol Total    Testosterone    Vitamin B12    Vitamin D 25 Hydroxy    TSH    Hemoglobin A1C      6. Hot flashes        7. BMI 34.0-34.9,adult            MEDICATIONS:  No orders of the defined types were placed in this encounter.      ORDERS:  Orders Placed This Encounter   Procedures    Follicle Stimulating Hormone    DHEA-Sulfate    Cortisol Total    Testosterone    Vitamin B12    Vitamin D 25 Hydroxy    TSH    Hemoglobin A1C       PLAN:  WWE complete  Discussed healthy lifestyle changes and labs ordered- plan treatment based on diagnostics  Also consider short trial of Phentermine- pt will consider and f/u    Patient Instructions   Patient Education       Breast Self-Exam: Care Instructions  Overview     A breast self-exam means regularly checking your breasts for lumps or changes. Depending on your health history, your doctor may tell you to do breast self-exams. These help you learn how your

## 2024-12-19 LAB — DHEA-S SERPL-MCNC: 91 UG/DL (ref 99–340)

## 2025-01-09 ENCOUNTER — TELEMEDICINE (OUTPATIENT)
Dept: OBGYN CLINIC | Age: 35
End: 2025-01-09
Payer: OTHER GOVERNMENT

## 2025-01-09 DIAGNOSIS — E55.9 VITAMIN D DEFICIENCY: ICD-10-CM

## 2025-01-09 DIAGNOSIS — Z71.3 DIETARY COUNSELING AND SURVEILLANCE: ICD-10-CM

## 2025-01-09 DIAGNOSIS — E27.40 DEFICIENCY OF ADRENAL HORMONE (HCC): ICD-10-CM

## 2025-01-09 PROCEDURE — 99214 OFFICE O/P EST MOD 30 MIN: CPT | Performed by: NURSE PRACTITIONER

## 2025-01-09 RX ORDER — ERGOCALCIFEROL 1.25 MG/1
50000 CAPSULE, LIQUID FILLED ORAL WEEKLY
Qty: 12 CAPSULE | Refills: 1 | Status: SHIPPED | OUTPATIENT
Start: 2025-01-09

## 2025-01-09 RX ORDER — PHENTERMINE HYDROCHLORIDE 15 MG/1
15 CAPSULE ORAL EVERY MORNING
Qty: 30 CAPSULE | Refills: 2 | Status: SHIPPED | OUTPATIENT
Start: 2025-01-09 | End: 2025-04-09

## 2025-01-09 ASSESSMENT — ENCOUNTER SYMPTOMS
EYES NEGATIVE: 1
GASTROINTESTINAL NEGATIVE: 1
ALLERGIC/IMMUNOLOGIC NEGATIVE: 1
DIARRHEA: 0
RESPIRATORY NEGATIVE: 1
CONSTIPATION: 0

## 2025-01-09 NOTE — PROGRESS NOTES
Pinky Pete is a 34 y.o. female who presents today for her medical conditions/ complaints as noted below. Pinky Pete is c/o of No chief complaint on file.    Pinky Pete, was evaluated through a synchronous (real-time) audio-video encounter. The patient (or guardian if applicable) is aware that this is a billable service, which includes applicable co-pays. This Virtual Visit was conducted with patient's (and/or legal guardian's) consent. Patient identification was verified, and a caregiver was present when appropriate.   The patient was located at Home: [address] [facility] [place] [address]  Provider was located at Facility (Appt Dept): [facility]  [address]  [place] [address]  Confirm you are appropriately licensed, registered, or certified to deliver care in the [facility] where the patient is located as indicated above. If you are not or unsure, please re-schedule the visit: Yes, I confirm.       HPI  Pt presents for f/u on fatigue, difficulty losing weight and hot flashes. Labs revealed Vitamin D deficiency and low DHEA. Has resumed Vitamin D supplement from a previous script. Had f/u with [facility] for low DHEA and starting supplement tomorrow. Interested in starting Phentermine for short term to help with appetite suppression and energy level.     Patient's last menstrual period was 2022.      No past medical history on file.  Past Surgical History:   Procedure Laterality Date    ANTERIOR CRUCIATE LIGAMENT REPAIR      2006    APPENDECTOMY       SECTION      x's 2    CHOLECYSTECTOMY      COLONOSCOPY  2014    [facility]    GALLBLADDER SURGERY      May 2013    HYSTERECTOMY (CERVIX STATUS UNKNOWN) N/A 2022    ROBOTIC TOTAL LAPAROSCOPIC HYSTERECTOMY, CYSTOSCOPY, & EUA performed by Anjali Romero MD at [facility] OR    TUBAL LIGATION      UPPER GASTROINTESTINAL ENDOSCOPY  2013         Family History   Problem Relation Age of Onset    Ulcerative Colitis Neg Hx

## 2025-02-06 DIAGNOSIS — Z71.3 DIETARY COUNSELING AND SURVEILLANCE: ICD-10-CM

## 2025-02-06 RX ORDER — PHENTERMINE HYDROCHLORIDE 15 MG/1
15 CAPSULE ORAL EVERY MORNING
Qty: 30 CAPSULE | Refills: 1 | Status: SHIPPED | OUTPATIENT
Start: 2025-02-06 | End: 2025-02-07 | Stop reason: SDUPTHER

## 2025-02-07 ENCOUNTER — TELEPHONE (OUTPATIENT)
Dept: OBGYN CLINIC | Age: 35
End: 2025-02-07

## 2025-02-07 DIAGNOSIS — Z71.3 DIETARY COUNSELING AND SURVEILLANCE: ICD-10-CM

## 2025-02-07 RX ORDER — PHENTERMINE HYDROCHLORIDE 15 MG/1
15 CAPSULE ORAL EVERY MORNING
Qty: 30 CAPSULE | Refills: 2 | Status: SHIPPED | OUTPATIENT
Start: 2025-02-07 | End: 2025-05-08

## 2025-02-07 NOTE — TELEPHONE ENCOUNTER
Patient went to pharmacy to  her medication and the pharmacy said that the 15 mg was cancel by Anne-Marie. Patient ask if the prescription can be sent in today to [facility].      Thank you

## 2025-02-10 RX ORDER — PHENTERMINE HYDROCHLORIDE 15 MG/1
CAPSULE ORAL
Qty: 30 CAPSULE | Refills: 3 | OUTPATIENT
Start: 2025-02-10

## 2025-03-03 DIAGNOSIS — Z71.3 DIETARY COUNSELING AND SURVEILLANCE: ICD-10-CM

## 2025-03-03 RX ORDER — PHENTERMINE HYDROCHLORIDE 15 MG/1
15 CAPSULE ORAL EVERY MORNING
Qty: 30 CAPSULE | Refills: 2 | Status: SHIPPED | OUTPATIENT
Start: 2025-03-03 | End: 2025-06-01

## 2025-04-03 DIAGNOSIS — Z71.3 DIETARY COUNSELING AND SURVEILLANCE: ICD-10-CM

## 2025-04-03 RX ORDER — PHENTERMINE HYDROCHLORIDE 15 MG/1
15 CAPSULE ORAL EVERY MORNING
Qty: 30 CAPSULE | Refills: 2 | OUTPATIENT
Start: 2025-04-03 | End: 2025-07-02

## 2025-04-14 ENCOUNTER — TELEMEDICINE (OUTPATIENT)
Dept: OBGYN CLINIC | Age: 35
End: 2025-04-14
Payer: OTHER GOVERNMENT

## 2025-04-14 DIAGNOSIS — Z51.81 MEDICATION MONITORING ENCOUNTER: ICD-10-CM

## 2025-04-14 DIAGNOSIS — E27.40 DEFICIENCY OF ADRENAL HORMONE: ICD-10-CM

## 2025-04-14 DIAGNOSIS — E55.9 VITAMIN D DEFICIENCY: ICD-10-CM

## 2025-04-14 DIAGNOSIS — Z71.3 DIETARY COUNSELING AND SURVEILLANCE: Primary | ICD-10-CM

## 2025-04-14 PROCEDURE — 99214 OFFICE O/P EST MOD 30 MIN: CPT | Performed by: NURSE PRACTITIONER

## 2025-04-14 RX ORDER — PHENTERMINE HYDROCHLORIDE 37.5 MG/1
37.5 TABLET ORAL
Qty: 30 TABLET | Refills: 1 | Status: SHIPPED | OUTPATIENT
Start: 2025-04-14 | End: 2025-06-13

## 2025-04-14 ASSESSMENT — ENCOUNTER SYMPTOMS
ALLERGIC/IMMUNOLOGIC NEGATIVE: 1
EYES NEGATIVE: 1
RESPIRATORY NEGATIVE: 1
DIARRHEA: 0
GASTROINTESTINAL NEGATIVE: 1
CONSTIPATION: 0

## 2025-04-14 NOTE — PROGRESS NOTES
Family History   Problem Relation Age of Onset    Ulcerative Colitis Neg Hx     Uterine Cancer Neg Hx     Stomach Cancer Neg Hx     Rectal Cancer Neg Hx     Prostate Cancer Neg Hx     Liver Cancer Neg Hx     Irritable Bowel Syndrome Neg Hx     Inflam Bowel Dis Neg Hx     Esophageal Cancer Neg Hx     Crohn's Disease Neg Hx     Colon Polyps Neg Hx     Colon Cancer Neg Hx      Social History     Tobacco Use    Smoking status: Never    Smokeless tobacco: Never   Substance Use Topics    Alcohol use: No       Current Outpatient Medications   Medication Sig Dispense Refill    phentermine (ADIPEX-P) 37.5 MG tablet Take 1 tablet by mouth every morning (before breakfast) for 60 days. Max Daily Amount: 37.5 mg 30 tablet 1    vitamin D (ERGOCALCIFEROL) 1.25 MG (17887 UT) CAPS capsule Take 1 capsule by mouth once a week 12 capsule 1     No current facility-administered medications for this visit.     No Known Allergies  There were no vitals filed for this visit.  There is no height or weight on file to calculate BMI.    Review of Systems   Constitutional: Negative.    HENT: Negative.     Eyes: Negative.    Respiratory: Negative.     Cardiovascular: Negative.    Gastrointestinal: Negative.  Negative for constipation and diarrhea.   Endocrine: Negative.    Genitourinary: Negative.  Negative for frequency, menstrual problem and urgency.   Musculoskeletal: Negative.    Skin: Negative.    Allergic/Immunologic: Negative.    Neurological: Negative.    Hematological: Negative.    Psychiatric/Behavioral: Negative.     All other systems reviewed and are negative.        Physical Exam  Vitals and nursing note reviewed.   Constitutional:       Appearance: She is well-developed.   HENT:      Head: Normocephalic.      Right Ear: External ear normal.      Left Ear: External ear normal.      Nose: Nose normal.   Musculoskeletal:         General: Normal range of motion.      Cervical back: Normal range of motion.   Skin:     General: Skin is

## 2025-05-12 RX ORDER — PHENTERMINE HYDROCHLORIDE 37.5 MG/1
37.5 TABLET ORAL
Qty: 30 TABLET | Refills: 1 | Status: SHIPPED | OUTPATIENT
Start: 2025-05-12 | End: 2025-07-11

## 2025-06-09 RX ORDER — PHENTERMINE HYDROCHLORIDE 37.5 MG/1
37.5 TABLET ORAL
Qty: 30 TABLET | Refills: 1 | Status: SHIPPED | OUTPATIENT
Start: 2025-06-09 | End: 2025-08-08

## 2025-07-08 RX ORDER — PHENTERMINE HYDROCHLORIDE 37.5 MG/1
37.5 TABLET ORAL
Qty: 30 TABLET | Refills: 0 | Status: SHIPPED | OUTPATIENT
Start: 2025-07-08 | End: 2025-08-07

## 2025-07-10 ENCOUNTER — TELEMEDICINE (OUTPATIENT)
Dept: OBGYN CLINIC | Age: 35
End: 2025-07-10

## 2025-07-10 DIAGNOSIS — Z71.3 DIETARY COUNSELING AND SURVEILLANCE: ICD-10-CM

## 2025-07-10 DIAGNOSIS — E27.40 DEFICIENCY OF ADRENAL HORMONE: ICD-10-CM

## 2025-07-10 DIAGNOSIS — Z51.81 MEDICATION MONITORING ENCOUNTER: Primary | ICD-10-CM

## 2025-07-10 DIAGNOSIS — E55.9 VITAMIN D DEFICIENCY: ICD-10-CM

## 2025-07-10 ASSESSMENT — ENCOUNTER SYMPTOMS
EYES NEGATIVE: 1
ALLERGIC/IMMUNOLOGIC NEGATIVE: 1
GASTROINTESTINAL NEGATIVE: 1
DIARRHEA: 0
RESPIRATORY NEGATIVE: 1
CONSTIPATION: 0

## 2025-07-10 NOTE — PROGRESS NOTES
Relation Age of Onset    Ulcerative Colitis Neg Hx     Uterine Cancer Neg Hx     Stomach Cancer Neg Hx     Rectal Cancer Neg Hx     Prostate Cancer Neg Hx     Liver Cancer Neg Hx     Irritable Bowel Syndrome Neg Hx     Inflam Bowel Dis Neg Hx     Esophageal Cancer Neg Hx     Crohn's Disease Neg Hx     Colon Polyps Neg Hx     Colon Cancer Neg Hx      Social History     Tobacco Use    Smoking status: Never    Smokeless tobacco: Never   Substance Use Topics    Alcohol use: No       Current Outpatient Medications   Medication Sig Dispense Refill    phentermine (ADIPEX-P) 37.5 MG tablet Take 1 tablet by mouth every morning (before breakfast) for 30 days. Max Daily Amount: 37.5 mg 30 tablet 0    vitamin D (ERGOCALCIFEROL) 1.25 MG (57634 UT) CAPS capsule Take 1 capsule by mouth once a week 12 capsule 1     No current facility-administered medications for this visit.     No Known Allergies  There were no vitals filed for this visit.  There is no height or weight on file to calculate BMI.    Review of Systems   Constitutional: Negative.    HENT: Negative.     Eyes: Negative.    Respiratory: Negative.     Cardiovascular: Negative.    Gastrointestinal: Negative.  Negative for constipation and diarrhea.   Endocrine: Negative.    Genitourinary: Negative.  Negative for frequency, menstrual problem and urgency.   Musculoskeletal: Negative.    Skin: Negative.    Allergic/Immunologic: Negative.    Neurological: Negative.    Hematological: Negative.    Psychiatric/Behavioral: Negative.     All other systems reviewed and are negative.        Physical Exam  Vitals and nursing note reviewed.   Constitutional:       Appearance: She is well-developed.   HENT:      Head: Normocephalic.      Right Ear: External ear normal.      Left Ear: External ear normal.      Nose: Nose normal.   Musculoskeletal:         General: Normal range of motion.      Cervical back: Normal range of motion.   Skin:     General: Skin is warm and dry.

## (undated) DEVICE — APPL CHLORAPREP W/TINT 26ML ORNG

## (undated) DEVICE — ADHESIVE SKIN CLSR 0.7ML TOP DERMBND ADV

## (undated) DEVICE — DRSNG BRDR MEPILEX P/OP SIL 4X12IN

## (undated) DEVICE — SUT MNCRYL 4/0 PS2 27IN UD MCP426H

## (undated) DEVICE — COVER LT HNDL BLU PLAS

## (undated) DEVICE — SYRINGE MED 10ML TRNSLUC BRL PLUNG BLK MRK POLYPR CTRL

## (undated) DEVICE — TRI-LUMEN FILTERED TUBE SET WITH ACTIVATED CHARCOAL FILTER: Brand: AIRSEAL

## (undated) DEVICE — GLV SURG SENSICARE SLT PF LF 6 STRL

## (undated) DEVICE — CANNULA SEAL

## (undated) DEVICE — SUT PDS 1 TP1 48IN Z880G BX/12

## (undated) DEVICE — SUT  GUT PLAIN 2/0 CT1 27IN 843H

## (undated) DEVICE — GLV SURG OR CLASSIC PWDR LTX 6.5 STRL

## (undated) DEVICE — SOL NACL 0.9PCT 1000ML

## (undated) DEVICE — SUTURE MCRYL SZ 4-0 L18IN ABSRB UD L19MM PS-2 3/8 CIR PRIM Y496G

## (undated) DEVICE — SAFEPICO ASPIRATOR ARTERIAL BLOOD SAMPLER. ASPIRATOR ONLY WITH VENTED TIPCAP, 1.5 ML, BOX OF 100 PCS.: Brand: SAFEPICO ASPIRATOR

## (undated) DEVICE — SUT MNCRYL 0/0 CTX 36IN Y398H

## (undated) DEVICE — ANTIBACTERIAL UNDYED BRAIDED (POLYGLACTIN 910), SYNTHETIC ABSORBABLE SUTURE: Brand: COATED VICRYL

## (undated) DEVICE — APPL CHLORAPREP HI/LITE 26ML ORNG

## (undated) DEVICE — SOLUTION IV IRRIG WATER 1000ML POUR BRL 2F7114

## (undated) DEVICE — GOWN,PREVENTION PLUS,LARGE,STERILE: Brand: MEDLINE

## (undated) DEVICE — APPLICATOR LAP 35 CM 2 RIGID VISTASEAL

## (undated) DEVICE — FRCP BIPOL CUT 5MM

## (undated) DEVICE — HYPODERMIC SAFETY NEEDLE: Brand: MAGELLAN

## (undated) DEVICE — SUT GUT PLAIN TPR PT 2/0 27IN 53T

## (undated) DEVICE — TIP COVER ACCESSORY

## (undated) DEVICE — Y-TYPE TUR/BLADDER IRRIGATION SET, REGULATING CLAMP

## (undated) DEVICE — BLADELESS OBTURATOR: Brand: WECK VISTA

## (undated) DEVICE — AIRSEAL 8 MM ACCESS PORT AND LOW PROFILE OBTURATOR WITH BLADELESS OPTICAL TIP, 120 MM LENGTH: Brand: AIRSEAL

## (undated) DEVICE — SOLUTION ANTIFOG VIS SYS CLEARIFY LAPSCP

## (undated) DEVICE — VCARE MEDIUM, UTERINE MANIPULATOR, VAGINAL-CERVICAL-AHLUWALIA'S-RETRACTOR-ELEVATOR: Brand: VCARE

## (undated) DEVICE — CURAVIEW LED LARYNSCP BLDE

## (undated) DEVICE — ARM DRAPE

## (undated) DEVICE — SUT VIC 0 CT1 36IN J946H

## (undated) DEVICE — SEALANT TISS 10 CC FIBRIN VISTASEAL

## (undated) DEVICE — ANTIBACTERIAL VIOLET BRAIDED (POLYGLACTIN 910), SYNTHETIC ABSORBABLE SUTURE: Brand: COATED VICRYL

## (undated) DEVICE — PAD C-SECTION: Brand: MEDLINE INDUSTRIES, INC.

## (undated) DEVICE — SOLUTION IV 1000ML 0.9% SOD CHL FOR IRRIG PLAS CONT

## (undated) DEVICE — SUT GUT PLN 0 CT3 27IN N864H

## (undated) DEVICE — GOWN,PRECEPT,XLNG/XXLARGE,STRL: Brand: MEDLINE

## (undated) DEVICE — KENDALL SCD EXPRESS SLEEVES, KNEE LENGTH, LARGE: Brand: KENDALL SCD

## (undated) DEVICE — GLOVE SURG SZ 75 CRM LTX FREE POLYISOPRENE POLYMER BEAD ANTI

## (undated) DEVICE — NEEDLE INSUF L150MM DIA2MM DISP FOR PNEUMOPERI ENDOPATH

## (undated) DEVICE — SKIN AFFIX SURG ADHESIVE 72/CS 0.55ML: Brand: MEDLINE

## (undated) DEVICE — 40595 XL TRENDELENBURG POSITIONING KIT: Brand: 40595 XL TRENDELENBURG POSITIONING KIT

## (undated) DEVICE — Device

## (undated) DEVICE — SUCTION IRRIGATOR: Brand: ENDOWRIST

## (undated) DEVICE — DAVINCI: Brand: MEDLINE INDUSTRIES, INC.

## (undated) DEVICE — SUTURE VCRL SZ 0 L36IN ABSRB UD L36MM CT-1 1/2 CIR J946H

## (undated) DEVICE — LARGE, DISPOSABLE C-SECTION RETRACTOR: Brand: ALEXIS ® O C-SECTION PROTECTOR/RETRACTOR

## (undated) DEVICE — KENDALL SCD EXPRESS SLEEVES, KNEE LENGTH, MEDIUM: Brand: KENDALL SCD

## (undated) DEVICE — PROXIMATE RH ROTATING HEAD SKIN STAPLERS (35 REGULAR) CONTAINS 35 STAINLESS STEEL STAPLES: Brand: PROXIMATE

## (undated) DEVICE — TUBE ET 7MM NSL ORAL BASIC CUF INTMED MURPHY EYE RADPQ MRK

## (undated) DEVICE — SOL ANTISTICK CAUTRY ELECTROLUBE LF

## (undated) DEVICE — INTENDED TO AID IN THE PASSING OF SUTURES THROUGH BONE AND SOFT TISSUE DURING ORTHOPEDIC SURGERY: Brand: HOFFEE SUTURE RETRIEVER

## (undated) DEVICE — SUTURE DEV SZ 2-0 WND CLSR ABSRB GS-22 VLOC COVIDIEN VLOCM2145